# Patient Record
Sex: FEMALE | Race: BLACK OR AFRICAN AMERICAN | NOT HISPANIC OR LATINO | Employment: FULL TIME | URBAN - METROPOLITAN AREA
[De-identification: names, ages, dates, MRNs, and addresses within clinical notes are randomized per-mention and may not be internally consistent; named-entity substitution may affect disease eponyms.]

---

## 2017-04-27 ENCOUNTER — HISTORICAL (OUTPATIENT)
Dept: RADIOLOGY | Facility: HOSPITAL | Age: 72
End: 2017-04-27

## 2017-06-09 ENCOUNTER — HISTORICAL (OUTPATIENT)
Dept: ADMINISTRATIVE | Facility: HOSPITAL | Age: 72
End: 2017-06-09

## 2017-06-09 LAB
ABS NEUT (OLG): 3.3 X10(3)/MCL (ref 2.1–9.2)
ALBUMIN SERPL-MCNC: 4.1 GM/DL (ref 3.4–5)
ALBUMIN/GLOB SERPL: 1.3 RATIO (ref 1.1–2)
ALP SERPL-CCNC: 106 UNIT/L (ref 38–126)
ALT SERPL-CCNC: 16 UNIT/L (ref 12–78)
APPEARANCE, UA: CLEAR
AST SERPL-CCNC: 9 UNIT/L (ref 15–37)
BACTERIA SPEC CULT: NORMAL /HPF
BASOPHILS # BLD AUTO: 0 X10(3)/MCL (ref 0–0.2)
BASOPHILS NFR BLD AUTO: 0 %
BILIRUB SERPL-MCNC: 0.6 MG/DL (ref 0.2–1)
BILIRUB UR QL STRIP: NEGATIVE
BILIRUBIN DIRECT+TOT PNL SERPL-MCNC: 0.1 MG/DL (ref 0–0.5)
BILIRUBIN DIRECT+TOT PNL SERPL-MCNC: 0.5 MG/DL (ref 0–0.8)
BUN SERPL-MCNC: 11 MG/DL (ref 7–18)
CALCIUM SERPL-MCNC: 9.3 MG/DL (ref 8.5–10.1)
CHLORIDE SERPL-SCNC: 105 MMOL/L (ref 98–107)
CHOLEST SERPL-MCNC: 285 MG/DL (ref 0–200)
CHOLEST/HDLC SERPL: 4.3 {RATIO} (ref 0–4)
CO2 SERPL-SCNC: 30 MMOL/L (ref 21–32)
COLOR UR: YELLOW
CREAT SERPL-MCNC: 0.83 MG/DL (ref 0.55–1.02)
DEPRECATED CALCIDIOL+CALCIFEROL SERPL-MC: 10 NG/ML (ref 30–80)
EOSINOPHIL # BLD AUTO: 0.1 X10(3)/MCL (ref 0–0.9)
EOSINOPHIL NFR BLD AUTO: 2 %
ERYTHROCYTE [DISTWIDTH] IN BLOOD BY AUTOMATED COUNT: 12.7 % (ref 11.5–17)
GLOBULIN SER-MCNC: 3.2 GM/DL (ref 2.4–3.5)
GLUCOSE (UA): NEGATIVE
GLUCOSE SERPL-MCNC: 81 MG/DL (ref 74–106)
HCT VFR BLD AUTO: 40.9 % (ref 37–47)
HDLC SERPL-MCNC: 67 MG/DL (ref 35–60)
HGB BLD-MCNC: 12.9 GM/DL (ref 12–16)
HGB UR QL STRIP: NEGATIVE
KETONES UR QL STRIP: NEGATIVE
LDLC SERPL CALC-MCNC: 186 MG/DL (ref 0–129)
LEUKOCYTE ESTERASE UR QL STRIP: NEGATIVE
LYMPHOCYTES # BLD AUTO: 1.4 X10(3)/MCL (ref 0.6–4.6)
LYMPHOCYTES NFR BLD AUTO: 27 %
MCH RBC QN AUTO: 26.9 PG (ref 27–31)
MCHC RBC AUTO-ENTMCNC: 31.5 GM/DL (ref 33–36)
MCV RBC AUTO: 85.4 FL (ref 80–94)
MONOCYTES # BLD AUTO: 0.4 X10(3)/MCL (ref 0.1–1.3)
MONOCYTES NFR BLD AUTO: 7 %
NEUTROPHILS # BLD AUTO: 3.3 X10(3)/MCL (ref 2.1–9.2)
NEUTROPHILS NFR BLD AUTO: 62 %
NITRITE UR QL STRIP: NEGATIVE
PH UR STRIP: 5 [PH] (ref 5–9)
PLATELET # BLD AUTO: 300 X10(3)/MCL (ref 130–400)
PMV BLD AUTO: 11.2 FL (ref 9.4–12.4)
POTASSIUM SERPL-SCNC: 4.7 MMOL/L (ref 3.5–5.1)
PROT SERPL-MCNC: 7.3 GM/DL (ref 6.4–8.2)
PROT UR QL STRIP: NEGATIVE
RBC # BLD AUTO: 4.79 X10(6)/MCL (ref 4.2–5.4)
RBC #/AREA URNS HPF: NORMAL /[HPF]
SODIUM SERPL-SCNC: 143 MMOL/L (ref 136–145)
SP GR UR STRIP: 1.02 (ref 1–1.03)
SQUAMOUS EPITHELIAL, UA: NORMAL
TRIGL SERPL-MCNC: 160 MG/DL (ref 30–150)
TSH SERPL-ACNC: 0.15 MIU/ML (ref 0.36–3.74)
UROBILINOGEN UR STRIP-ACNC: 0.2
VLDLC SERPL CALC-MCNC: 32 MG/DL
WBC # SPEC AUTO: 5.3 X10(3)/MCL (ref 4.5–11.5)
WBC #/AREA URNS HPF: NORMAL /[HPF]

## 2018-06-21 ENCOUNTER — HISTORICAL (OUTPATIENT)
Dept: ADMINISTRATIVE | Facility: HOSPITAL | Age: 73
End: 2018-06-21

## 2018-09-10 ENCOUNTER — HISTORICAL (OUTPATIENT)
Dept: RADIOLOGY | Facility: HOSPITAL | Age: 73
End: 2018-09-10

## 2019-06-24 ENCOUNTER — HISTORICAL (OUTPATIENT)
Dept: ADMINISTRATIVE | Facility: HOSPITAL | Age: 74
End: 2019-06-24

## 2020-02-07 ENCOUNTER — HISTORICAL (OUTPATIENT)
Dept: ADMINISTRATIVE | Facility: HOSPITAL | Age: 75
End: 2020-02-07

## 2020-07-06 ENCOUNTER — HISTORICAL (OUTPATIENT)
Dept: ADMINISTRATIVE | Facility: HOSPITAL | Age: 75
End: 2020-07-06

## 2020-09-01 ENCOUNTER — HISTORICAL (OUTPATIENT)
Dept: RADIOLOGY | Facility: HOSPITAL | Age: 75
End: 2020-09-01

## 2020-12-07 ENCOUNTER — HISTORICAL (OUTPATIENT)
Dept: ADMINISTRATIVE | Facility: HOSPITAL | Age: 75
End: 2020-12-07

## 2021-04-06 ENCOUNTER — HISTORICAL (OUTPATIENT)
Dept: ADMINISTRATIVE | Facility: HOSPITAL | Age: 76
End: 2021-04-06

## 2021-06-03 ENCOUNTER — HISTORICAL (OUTPATIENT)
Dept: ADMINISTRATIVE | Facility: HOSPITAL | Age: 76
End: 2021-06-03

## 2021-06-03 LAB
ABS NEUT (OLG): 3.88 X10(3)/MCL (ref 2.1–9.2)
ALBUMIN SERPL-MCNC: 3.8 GM/DL (ref 3.4–4.8)
ALBUMIN/GLOB SERPL: 1.4 RATIO (ref 1.1–2)
ALP SERPL-CCNC: 110 UNIT/L (ref 40–150)
ALT SERPL-CCNC: 6 UNIT/L (ref 0–55)
APPEARANCE, UA: CLEAR
AST SERPL-CCNC: 12 UNIT/L (ref 5–34)
BACTERIA SPEC CULT: ABNORMAL /HPF
BASOPHILS # BLD AUTO: 0 X10(3)/MCL (ref 0–0.2)
BASOPHILS NFR BLD AUTO: 0 %
BILIRUB SERPL-MCNC: 0.6 MG/DL
BILIRUB UR QL STRIP: NEGATIVE
BILIRUBIN DIRECT+TOT PNL SERPL-MCNC: 0.2 MG/DL (ref 0–0.5)
BILIRUBIN DIRECT+TOT PNL SERPL-MCNC: 0.4 MG/DL (ref 0–0.8)
BUN SERPL-MCNC: 14.3 MG/DL (ref 9.8–20.1)
CALCIUM SERPL-MCNC: 9.5 MG/DL (ref 8.4–10.2)
CHLORIDE SERPL-SCNC: 105 MMOL/L (ref 98–107)
CHOLEST SERPL-MCNC: 202 MG/DL
CHOLEST/HDLC SERPL: 4 {RATIO} (ref 0–5)
CO2 SERPL-SCNC: 27 MMOL/L (ref 23–31)
COLOR UR: YELLOW
CREAT SERPL-MCNC: 0.82 MG/DL (ref 0.55–1.02)
EOSINOPHIL # BLD AUTO: 0.3 X10(3)/MCL (ref 0–0.9)
EOSINOPHIL NFR BLD AUTO: 4 %
ERYTHROCYTE [DISTWIDTH] IN BLOOD BY AUTOMATED COUNT: 13 % (ref 11.5–17)
GLOBULIN SER-MCNC: 2.8 GM/DL (ref 2.4–3.5)
GLUCOSE (UA): NEGATIVE
GLUCOSE SERPL-MCNC: 87 MG/DL (ref 82–115)
HCT VFR BLD AUTO: 38.8 % (ref 37–47)
HDLC SERPL-MCNC: 48 MG/DL (ref 35–60)
HGB BLD-MCNC: 12.3 GM/DL (ref 12–16)
HGB UR QL STRIP: NEGATIVE
KETONES UR QL STRIP: NEGATIVE
LDLC SERPL CALC-MCNC: 133 MG/DL (ref 50–140)
LEUKOCYTE ESTERASE UR QL STRIP: ABNORMAL
LYMPHOCYTES # BLD AUTO: 1.7 X10(3)/MCL (ref 0.6–4.6)
LYMPHOCYTES NFR BLD AUTO: 26 %
MCH RBC QN AUTO: 26.7 PG (ref 27–31)
MCHC RBC AUTO-ENTMCNC: 31.7 GM/DL (ref 33–36)
MCV RBC AUTO: 84.2 FL (ref 80–94)
MONOCYTES # BLD AUTO: 0.5 X10(3)/MCL (ref 0.1–1.3)
MONOCYTES NFR BLD AUTO: 8 %
NEUTROPHILS # BLD AUTO: 3.88 X10(3)/MCL (ref 2.1–9.2)
NEUTROPHILS NFR BLD AUTO: 61 %
NITRITE UR QL STRIP: NEGATIVE
PH UR STRIP: 6 [PH] (ref 5–9)
PLATELET # BLD AUTO: 302 X10(3)/MCL (ref 130–400)
PMV BLD AUTO: 11.7 FL (ref 9.4–12.4)
POTASSIUM SERPL-SCNC: 4.6 MMOL/L (ref 3.5–5.1)
PROT SERPL-MCNC: 6.6 GM/DL (ref 5.8–7.6)
PROT UR QL STRIP: NEGATIVE
RBC # BLD AUTO: 4.61 X10(6)/MCL (ref 4.2–5.4)
RBC #/AREA URNS HPF: ABNORMAL /[HPF]
SODIUM SERPL-SCNC: 144 MMOL/L (ref 136–145)
SP GR UR STRIP: 1.02 (ref 1–1.03)
SQUAMOUS EPITHELIAL, UA: ABNORMAL /HPF (ref 0–4)
T3FREE SERPL-MCNC: 3.4 PG/ML (ref 1.58–3.91)
T4 FREE SERPL-MCNC: 1.3 NG/DL (ref 0.7–1.48)
TRIGL SERPL-MCNC: 104 MG/DL (ref 37–140)
TSH SERPL-ACNC: <0.0083 UIU/ML (ref 0.35–4.94)
UROBILINOGEN UR STRIP-ACNC: 0.2
VLDLC SERPL CALC-MCNC: 21 MG/DL
WBC # SPEC AUTO: 6.4 X10(3)/MCL (ref 4.5–11.5)
WBC #/AREA URNS HPF: ABNORMAL /[HPF]

## 2021-10-13 ENCOUNTER — HISTORICAL (OUTPATIENT)
Dept: RADIOLOGY | Facility: HOSPITAL | Age: 76
End: 2021-10-13

## 2021-12-08 ENCOUNTER — HISTORICAL (OUTPATIENT)
Dept: ADMINISTRATIVE | Facility: HOSPITAL | Age: 76
End: 2021-12-08

## 2021-12-08 LAB
ABS NEUT (OLG): 4.03 X10(3)/MCL (ref 2.1–9.2)
ALBUMIN SERPL-MCNC: 4.2 GM/DL (ref 3.4–4.8)
ALBUMIN/GLOB SERPL: 1.4 RATIO (ref 1.1–2)
ALP SERPL-CCNC: 114 UNIT/L (ref 40–150)
ALT SERPL-CCNC: 10 UNIT/L (ref 0–55)
APPEARANCE, UA: CLEAR
AST SERPL-CCNC: 13 UNIT/L (ref 5–34)
BACTERIA SPEC CULT: ABNORMAL /HPF
BASOPHILS # BLD AUTO: 0 X10(3)/MCL (ref 0–0.2)
BASOPHILS NFR BLD AUTO: 0 %
BILIRUB SERPL-MCNC: 0.6 MG/DL
BILIRUB UR QL STRIP: NEGATIVE
BILIRUBIN DIRECT+TOT PNL SERPL-MCNC: 0.2 MG/DL (ref 0–0.5)
BILIRUBIN DIRECT+TOT PNL SERPL-MCNC: 0.4 MG/DL (ref 0–0.8)
BUN SERPL-MCNC: 12.5 MG/DL (ref 9.8–20.1)
CALCIUM SERPL-MCNC: 9.8 MG/DL (ref 8.7–10.5)
CHLORIDE SERPL-SCNC: 105 MMOL/L (ref 98–107)
CHOLEST SERPL-MCNC: 216 MG/DL
CHOLEST/HDLC SERPL: 4 {RATIO} (ref 0–5)
CO2 SERPL-SCNC: 27 MMOL/L (ref 23–31)
COLOR UR: YELLOW
CREAT SERPL-MCNC: 0.83 MG/DL (ref 0.55–1.02)
DEPRECATED CALCIDIOL+CALCIFEROL SERPL-MC: 38.7 NG/ML (ref 30–80)
EOSINOPHIL # BLD AUTO: 0.1 X10(3)/MCL (ref 0–0.9)
EOSINOPHIL NFR BLD AUTO: 2 %
ERYTHROCYTE [DISTWIDTH] IN BLOOD BY AUTOMATED COUNT: 12.2 % (ref 11.5–17)
GLOBULIN SER-MCNC: 3 GM/DL (ref 2.4–3.5)
GLUCOSE (UA): NEGATIVE
GLUCOSE SERPL-MCNC: 87 MG/DL (ref 82–115)
HCT VFR BLD AUTO: 42.5 % (ref 37–47)
HDLC SERPL-MCNC: 50 MG/DL (ref 35–60)
HGB BLD-MCNC: 13.2 GM/DL (ref 12–16)
HGB UR QL STRIP: NEGATIVE
KETONES UR QL STRIP: NEGATIVE
LDLC SERPL CALC-MCNC: 143 MG/DL (ref 50–140)
LEUKOCYTE ESTERASE UR QL STRIP: ABNORMAL
LYMPHOCYTES # BLD AUTO: 1.6 X10(3)/MCL (ref 0.6–4.6)
LYMPHOCYTES NFR BLD AUTO: 26 %
MCH RBC QN AUTO: 26.5 PG (ref 27–31)
MCHC RBC AUTO-ENTMCNC: 31.1 GM/DL (ref 33–36)
MCV RBC AUTO: 85.3 FL (ref 80–94)
MONOCYTES # BLD AUTO: 0.4 X10(3)/MCL (ref 0.1–1.3)
MONOCYTES NFR BLD AUTO: 6 %
NEUTROPHILS # BLD AUTO: 4.03 X10(3)/MCL (ref 2.1–9.2)
NEUTROPHILS NFR BLD AUTO: 65 %
NITRITE UR QL STRIP: NEGATIVE
PH UR STRIP: 5.5 [PH] (ref 5–9)
PLATELET # BLD AUTO: 298 X10(3)/MCL (ref 130–400)
PMV BLD AUTO: 11.5 FL (ref 9.4–12.4)
POTASSIUM SERPL-SCNC: 3.9 MMOL/L (ref 3.5–5.1)
PROT SERPL-MCNC: 7.2 GM/DL (ref 5.8–7.6)
PROT UR QL STRIP: NEGATIVE
RBC # BLD AUTO: 4.98 X10(6)/MCL (ref 4.2–5.4)
RBC #/AREA URNS HPF: ABNORMAL /[HPF]
SODIUM SERPL-SCNC: 143 MMOL/L (ref 136–145)
SP GR UR STRIP: 1.02 (ref 1–1.03)
SQUAMOUS EPITHELIAL, UA: ABNORMAL /HPF (ref 0–4)
T3FREE SERPL-MCNC: 3.85 PG/ML (ref 1.58–3.91)
T4 FREE SERPL-MCNC: 1.51 NG/DL (ref 0.7–1.48)
TRIGL SERPL-MCNC: 116 MG/DL (ref 37–140)
TSH SERPL-ACNC: <0.0083 UIU/ML (ref 0.35–4.94)
UROBILINOGEN UR STRIP-ACNC: 0.2
VLDLC SERPL CALC-MCNC: 23 MG/DL
WBC # SPEC AUTO: 6.2 X10(3)/MCL (ref 4.5–11.5)
WBC #/AREA URNS HPF: ABNORMAL /[HPF]

## 2022-04-09 ENCOUNTER — HISTORICAL (OUTPATIENT)
Dept: ADMINISTRATIVE | Facility: HOSPITAL | Age: 77
End: 2022-04-09
Payer: MEDICARE

## 2022-04-25 VITALS
BODY MASS INDEX: 25.52 KG/M2 | OXYGEN SATURATION: 98 % | DIASTOLIC BLOOD PRESSURE: 74 MMHG | HEIGHT: 63 IN | SYSTOLIC BLOOD PRESSURE: 126 MMHG | WEIGHT: 144 LBS

## 2022-05-02 NOTE — HISTORICAL [facility]ER
This is a historical note converted from Arturo. Formatting and pictures may have been removed.  Please reference Arturo for original formatting and attached multimedia. Chief Complaint  Pt c/o right knee pain and swelling, no prior sx. Pt states in 2006 she broke her right patella.  History of Present Illness  Patient comes in today with complaints of right knee issues.  She?states that her knee does not really cause her much pain but is mainly stiffness she is concerned about want to have it evaluated.  Patient states she is had no recent injury or trauma?to the right knee and pain again is not a real issue with her.  She is been using occasional over-the-counter anti-inflammatories but not on a regular basis  Review of Systems  Constitutional: No fever, No chills.  Respiratory: No shortness of breath, No cough.  Cardiovascular: No chest pain.  Gastrointestinal: No nausea, No vomiting, No diarrhea, No constipation, No heartburn.  Genitourinary: No dysuria, No hematuria.  Hematology/Lymphatics: No bleeding tendency.  Endocrine: No polyuria.  Neurologic: Alert and oriented X4, No numbness, No tingling.  Psychiatric: No anxiety, No depression.  Integumentary:? negative except as documented in history of present illness  Physical Exam  Vitals & Measurements  HR:?74(Peripheral)? BP:?128/72?  HT:?160?cm? WT:?63.502?kg? BMI:?24.81?  Right knee exam  Mild?tenderness to palpation of the medial joint line  No effusion to the knee  Crepitus with ROM  ROM   Negative?Lianet?  No?laxity with varus / valgus stressing  Varus alignment of the knee  Good pulses to the feet  No pain with ROM of the hips  ??  X-rays:  Degenerative changes of the knee with narrowing of the medial compartment(s)?not quite bone-on-bone  Extra-articular osteophytes  Varus alignment  Diagnosis: Right knee osteoarthritis  Plan:?At this point the patient states pain is not a real issue with her and she does not wish to have an injection.  She states  that she would?use of over-the-counter anti-inflammatories work on some home exercises and she will follow-up in the future if pain becomes more problematic for.  ?  ?  Assessment/Plan  1.?Arthritis of right knee?M17.11   Problem List/Past Medical History  Ongoing  Arthritis of right knee  Foot pain  GERD (gastroesophageal reflux disease)  HLD (hyperlipidemia)  HTN (Hypertension)(  Confirmed  )  Low TSH level  Skin tag  Visit for screening mammogram  Vitamin D deficiency  Well adult exam  Historical  No qualifying data  Procedure/Surgical History  Colonoscopy (12/18/2017)  Colonoscopy (09/25/2014)  CLOSURE OF SKIN AND SUBCUTANEOUS TISSUE OTHER SITES (03/07/2014)  Simple repair of superficial wounds of face, ears, eyelids, nose, lips and/or mucous membranes; 2.5 cm or less. (03/07/2014)  WOUND CLOSURE UTILIZING TISSUE ADHESIVE(S) ONLY (03/07/2014)  Percutaneous abdominal drainage (07/17/2013)  Laparoscopic right hemicolectomy (07/02/2013)  Right hemicolectomy (2013)  Partial thyroidectomy (1997)  Tubal ligation (1985)  left knee arthroscopy (1980)   Medications  Allegra 24 Hour Allergy oral tablet, 180 mg= 1 tab(s), Oral, Daily  amlodipine 10 mg oral tablet, 10 mg= 1 tab(s), Oral, Daily, 3 refills  hydroCHLOROthiazide 12.5 mg oral capsule, See Instructions, 3 refills  meloxicam 7.5 mg oral tablet, 7.5 mg= 1 tab(s), Oral, Daily  Vitamin D3 1000 intl units oral capsule, 3000 IntUnit= 3 cap(s), Oral, Daily  Allergies  ACE inhibitors?(tongue swelling)  Social History  Abuse/Neglect  No, 02/07/2020  Alcohol - Denies Alcohol Use, 06/25/2013  Current, 1-2 times per year, 05/19/2015  Employment/School  Retired, 05/19/2015  Exercise  Exercise frequency: 1-2 times/week. Exercise type: Walking., 05/19/2015  Home/Environment  Lives with Alone., 05/19/2015  Nutrition/Health  Regular, 05/19/2015  Sexual  Sexually active: No., 05/31/2016  Substance Use - Denies Substance Abuse, 06/25/2013  Never, 05/19/2015  Tobacco - Denies  Tobacco Use, 06/25/2013  Never (less than 100 in lifetime), No, 02/07/2020  Family History  Hypertension.: Mother.  Primary malignant neoplasm of colon: Mother.  Tobacco: Negative: Mother.  Immunizations  Vaccine Date Status Comments   tetanus toxoid - Not Given Patient Refuses     Health Maintenance  Health Maintenance  ???Pending?(in the next year)  ??? ??OverDue  ??? ? ? ?Advance Directive due??01/01/20??and every 1??year(s)  ??? ? ? ?Geriatric Depression Screening due??01/01/20??and every 1??year(s)  ??? ??Due?  ??? ? ? ?Alcohol Misuse Screening due??01/01/20??and every 1??year(s)  ??? ? ? ?Cognitive Screening due??01/01/20??and every 1??year(s)  ??? ? ? ?Fall Risk Assessment due??01/01/20??and every 1??year(s)  ??? ? ? ?Functional Assessment due??01/01/20??and every 1??year(s)  ??? ? ? ?Bone Density Screening due??02/14/20??Variable frequency  ??? ? ? ?Pneumococcal Vaccine due??02/14/20??and every?  ??? ? ? ?Tetanus Vaccine due??02/14/20??and every 10??year(s)  ??? ? ? ?Zoster Vaccine due??02/14/20??and every 100??year(s)  ??? ??Refused?  ??? ? ? ?Pneumococcal Vaccine due??07/18/13??and every 1??day(s)  ??? ??Due In Future?  ??? ? ? ?Hypertension Management-BMP not due until??06/23/20??and every 1??year(s)  ??? ? ? ?ADL Screening not due until??07/03/20??and every 1??year(s)  ??? ? ? ?Medicare Annual Wellness Exam not due until??07/03/20??and every 1??year(s)  ??? ? ? ?Aspirin Therapy for CVD Prevention not due until??07/03/20??and every 1??year(s)  ??? ? ? ?Hypertension Management-Education not due until??07/03/20??and every 1??year(s)  ??? ? ? ?Breast Cancer Screening (Senior Wellness) not due until??09/09/20??and every 2??year(s)  ??? ? ? ?Obesity Screening not due until??01/01/21??and every 1??year(s)  ??? ? ? ?Hypertension Management-Blood Pressure not due until??02/06/21??and every 1??year(s)  ???Satisfied?(in the past 1 year)  ??? ??Satisfied?  ??? ? ? ?ADL Screening on??07/03/19.??Satisfied by Leandra  Scarlet WINCHESTER.  ??? ? ? ?Advance Directive on??07/03/19.??Satisfied by Scarlet Mobley LPN.  ??? ? ? ?Alcohol Misuse Screening on??07/03/19.??Satisfied by Scarlet Mobley LPN.  ??? ? ? ?Aspirin Therapy for CVD Prevention on??07/03/19.??Satisfied by Scarlet Mobley LPN.  ??? ? ? ?Blood Pressure Screening on??02/07/20.??Satisfied by Gustavo Blackmon  ??? ? ? ?Body Mass Index Check on??02/07/20.??Satisfied by Gustavo Blackmon  ??? ? ? ?Cognitive Screening on??07/03/19.??Satisfied by Scarlet Mobley LPN.  ??? ? ? ?Depression Screening on??07/03/19.??Satisfied by Scarlet Mobley LPN.  ??? ? ? ?Diabetes Screening on??06/24/19.??Satisfied by Carmel Keller.  ??? ? ? ?Fall Risk Assessment on??07/03/19.??Satisfied by Scarlet Mobley LPN.  ??? ? ? ?Functional Assessment on??07/03/19.??Satisfied by Scarlet Mobley LPN.  ??? ? ? ?Geriatric Depression Screening on??07/03/19.??Satisfied by Scarlet Mobley LPN.  ??? ? ? ?Hypertension Management-Blood Pressure on??02/07/20.??Satisfied by Gustavo Blackmon  ??? ? ? ?Influenza Vaccine on??02/07/20.??Satisfied by Gustavo Blackmon  ??? ? ? ?Lipid Screening on??06/24/19.??Satisfied by Carmel Keller  ??? ? ? ?Medicare Annual Wellness Exam on??07/03/19.??Satisfied by Anand Gordon II, MD  ??? ? ? ?Obesity Screening on??02/07/20.??Satisfied by Gustavo Blackmon  ?

## 2022-06-06 RX ORDER — AZITHROMYCIN 500 MG/1
500 TABLET, FILM COATED ORAL DAILY
Qty: 5 TABLET | Refills: 0 | Status: SHIPPED | OUTPATIENT
Start: 2022-06-06 | End: 2022-06-11

## 2022-06-06 RX ORDER — METHYLPREDNISOLONE 4 MG/1
TABLET ORAL
Qty: 21 EACH | Refills: 0 | Status: SHIPPED | OUTPATIENT
Start: 2022-06-06 | End: 2022-06-27

## 2022-06-06 NOTE — TELEPHONE ENCOUNTER
----- Message from Anand Gordon II, MD sent at 6/6/2022 11:34 AM CDT -----  Regarding: RE: Meds  Azithromycin 500 daily for 5 days and medrol pack  ----- Message -----  From: Scarlet Mobley LPN  Sent: 6/6/2022  10:57 AM CDT  To: Anand Gordon II, MD  Subject: FW: Meds                                           ----- Message -----  From: Bernice Gomez MA  Sent: 6/6/2022  10:47 AM CDT  To: Scarlet Mobley LPN  Subject: Meds                                             Patient has been having sinus issues for the past week & wants to know if something can be called in for her...

## 2022-06-20 RX ORDER — CODEINE PHOSPHATE AND GUAIFENESIN 10; 100 MG/5ML; MG/5ML
10 SOLUTION ORAL EVERY 6 HOURS PRN
Qty: 120 ML | Refills: 0 | Status: SHIPPED | OUTPATIENT
Start: 2022-06-20 | End: 2022-06-30

## 2022-06-20 RX ORDER — AZELASTINE 1 MG/ML
2 SPRAY, METERED NASAL 2 TIMES DAILY
Qty: 30 ML | Refills: 0 | Status: SHIPPED | OUTPATIENT
Start: 2022-06-20 | End: 2023-01-04 | Stop reason: SDUPTHER

## 2022-06-20 NOTE — TELEPHONE ENCOUNTER
----- Message from Anand Gordon II, MD sent at 6/20/2022  9:46 AM CDT -----  Tylenol 1000mg TID for 3 days and charatussin and azelastine BID  ----- Message -----  From: Scarlet Mobley LPN  Sent: 6/20/2022   9:39 AM CDT  To: Anand Gordon II, MD      ----- Message -----  From: Gretchen Gray  Sent: 6/20/2022   8:50 AM CDT  To: Scarlet Mobley LPN    Diagnosed with covid on last Wednesday  Still has a cough, and mucus in nasal passages    Uses [facility] on [address]

## 2022-06-29 ENCOUNTER — HOSPITAL ENCOUNTER (EMERGENCY)
Facility: HOSPITAL | Age: 77
Discharge: HOME | End: 2022-06-29
Attending: STUDENT IN AN ORGANIZED HEALTH CARE EDUCATION/TRAINING PROGRAM
Payer: MEDICARE

## 2022-06-29 VITALS
TEMPERATURE: 98 F | DIASTOLIC BLOOD PRESSURE: 90 MMHG | RESPIRATION RATE: 20 BRPM | SYSTOLIC BLOOD PRESSURE: 163 MMHG | HEART RATE: 89 BPM | OXYGEN SATURATION: 100 %

## 2022-06-29 DIAGNOSIS — R50.9 FEVER: ICD-10-CM

## 2022-06-29 LAB
ALBUMIN SERPL-MCNC: 4 GM/DL (ref 3.4–4.8)
ALBUMIN/GLOB SERPL: 1.4 RATIO (ref 1.1–2)
ALP SERPL-CCNC: 119 UNIT/L (ref 40–150)
ALT SERPL-CCNC: 10 UNIT/L (ref 0–55)
APPEARANCE UR: CLEAR
AST SERPL-CCNC: 12 UNIT/L (ref 5–34)
BACTERIA #/AREA URNS AUTO: NORMAL /HPF
BASOPHILS # BLD AUTO: 0.02 X10(3)/MCL (ref 0–0.2)
BASOPHILS NFR BLD AUTO: 0.2 %
BILIRUB UR QL STRIP.AUTO: NEGATIVE MG/DL
BILIRUBIN DIRECT+TOT PNL SERPL-MCNC: 0.3 MG/DL
BUN SERPL-MCNC: 13.4 MG/DL (ref 9.8–20.1)
CALCIUM SERPL-MCNC: 9.5 MG/DL (ref 8.4–10.2)
CHLORIDE SERPL-SCNC: 104 MMOL/L (ref 98–107)
CO2 SERPL-SCNC: 25 MMOL/L (ref 23–31)
COLOR UR AUTO: YELLOW
CREAT SERPL-MCNC: 0.9 MG/DL (ref 0.55–1.02)
EOSINOPHIL # BLD AUTO: 0.29 X10(3)/MCL (ref 0–0.9)
EOSINOPHIL NFR BLD AUTO: 2.6 %
ERYTHROCYTE [DISTWIDTH] IN BLOOD BY AUTOMATED COUNT: 12.6 % (ref 11.5–17)
GLOBULIN SER-MCNC: 2.9 GM/DL (ref 2.4–3.5)
GLUCOSE SERPL-MCNC: 117 MG/DL (ref 82–115)
GLUCOSE UR QL STRIP.AUTO: NEGATIVE MG/DL
HCT VFR BLD AUTO: 39.1 % (ref 37–47)
HGB BLD-MCNC: 12.3 GM/DL (ref 12–16)
IMM GRANULOCYTES # BLD AUTO: 0.06 X10(3)/MCL (ref 0–0.02)
IMM GRANULOCYTES NFR BLD AUTO: 0.5 % (ref 0–0.43)
KETONES UR QL STRIP.AUTO: NEGATIVE MG/DL
LEUKOCYTE ESTERASE UR QL STRIP.AUTO: ABNORMAL UNIT/L
LYMPHOCYTES # BLD AUTO: 2.19 X10(3)/MCL (ref 0.6–4.6)
LYMPHOCYTES NFR BLD AUTO: 20 %
MCH RBC QN AUTO: 27 PG (ref 27–31)
MCHC RBC AUTO-ENTMCNC: 31.5 MG/DL (ref 33–36)
MCV RBC AUTO: 85.9 FL (ref 80–94)
MONOCYTES # BLD AUTO: 0.6 X10(3)/MCL (ref 0.1–1.3)
MONOCYTES NFR BLD AUTO: 5.5 %
NEUTROPHILS # BLD AUTO: 7.8 X10(3)/MCL (ref 2.1–9.2)
NEUTROPHILS NFR BLD AUTO: 71.2 %
NITRITE UR QL STRIP.AUTO: NEGATIVE
NRBC BLD AUTO-RTO: 0 %
PH UR STRIP.AUTO: 6.5 [PH]
PLATELET # BLD AUTO: 333 X10(3)/MCL (ref 130–400)
PMV BLD AUTO: 10.2 FL (ref 7.4–10.4)
POTASSIUM SERPL-SCNC: 3.7 MMOL/L (ref 3.5–5.1)
PROT SERPL-MCNC: 6.9 GM/DL (ref 5.8–7.6)
PROT UR QL STRIP.AUTO: NEGATIVE MG/DL
RBC # BLD AUTO: 4.55 X10(6)/MCL (ref 4.2–5.4)
RBC #/AREA URNS AUTO: <5 /HPF
RBC UR QL AUTO: NEGATIVE UNIT/L
SODIUM SERPL-SCNC: 140 MMOL/L (ref 136–145)
SP GR UR STRIP.AUTO: 1 (ref 1–1.03)
SQUAMOUS #/AREA URNS AUTO: <5 /HPF
UROBILINOGEN UR STRIP-ACNC: 0.2 MG/DL
WBC # SPEC AUTO: 11 X10(3)/MCL (ref 4.5–11.5)
WBC #/AREA URNS AUTO: <5 /HPF

## 2022-06-29 PROCEDURE — 80053 COMPREHEN METABOLIC PANEL: CPT | Performed by: PHYSICIAN ASSISTANT

## 2022-06-29 PROCEDURE — 36415 COLL VENOUS BLD VENIPUNCTURE: CPT | Performed by: PHYSICIAN ASSISTANT

## 2022-06-29 PROCEDURE — 99284 EMERGENCY DEPT VISIT MOD MDM: CPT | Mod: 25

## 2022-06-29 PROCEDURE — 81001 URINALYSIS AUTO W/SCOPE: CPT | Performed by: PHYSICIAN ASSISTANT

## 2022-06-29 PROCEDURE — 85025 COMPLETE CBC W/AUTO DIFF WBC: CPT | Performed by: PHYSICIAN ASSISTANT

## 2022-06-29 NOTE — FIRST PROVIDER EVALUATION
Medical screening exam completed.  I have conducted a focused provider triage encounter, findings are as follows:    Brief history of present illness:  76 yo female presents to ED for evaluation of fever of 100.1 at home. Reports intermittent cough. Took Tylenol 45 min ago. States COVID+ on 6/11/22.     Vitals:    06/29/22 1840   BP: (!) 156/87   BP Location: Left arm   Patient Position: Sitting   Pulse: 108   Resp: 18   Temp: 98.2 °F (36.8 °C)   TempSrc: Oral   SpO2: 100%       Pertinent physical exam:  Awake, alert and oriented.    Brief workup plan:  Labs, CXR, UA    Preliminary workup initiated; this workup will be continued and followed by the physician or advanced practice provider that is assigned to the patient when roomed.

## 2022-06-30 NOTE — DISCHARGE INSTRUCTIONS
Follow-up with your primary care physician.    Use an oral thermometer when measuring your temperature.    Return to the emergency department if you have any new symptoms, difficulty breathing, chest pain, nausea, vomiting, fever, leg swelling  or any new symptoms.

## 2022-06-30 NOTE — ED PROVIDER NOTES
Encounter Date: 6/29/2022    SCRIBE #1 NOTE: I, Rohan Hood, am scribing for, and in the presence of,  Christian Stauffer. I have scribed the following portions of the note - Other sections scribed: HPI, ROS, PE, MDM.       History     Chief Complaint   Patient presents with    Fever     Starting this afternoon. Tested positive for covid 06/11.     IChristian, assumed care at 2315    76 y/o F with hx of HTN presents to ED c/o fever that onset this afternoon with her highest checked fever being 100.1 with a temporal thermometer. Pt had COVID on 6/11 and was back to normal on 6/16. Pt explains she was concerned about a possible infection, but denies cough, N/V, chills, dysuria, rash, chest pain, or SOB. Pt is adherent with her HTN medications.    The history is provided by the patient. No  was used.   Fever  Primary symptoms of the febrile illness include fever. Primary symptoms do not include fatigue, cough, nausea, vomiting, dysuria or rash. The current episode started today. This is a new problem. The problem has been resolved.   The maximum temperature recorded prior to her arrival was 100 to 100.9 F. Temperature source: temporal thermometer.     Review of patient's allergies indicates:   Allergen Reactions    Ace inhibitors      Other reaction(s): tongue swelling     Past Medical History:   Diagnosis Date    Hypertension      History reviewed. No pertinent surgical history.  History reviewed. No pertinent family history.  Social History     Tobacco Use    Smoking status: Never Smoker    Smokeless tobacco: Never Used     Review of Systems   Constitutional: Positive for fever. Negative for fatigue.   HENT: Negative.  Negative for rhinorrhea.    Eyes: Negative.    Respiratory: Negative.  Negative for cough.    Cardiovascular: Negative.    Gastrointestinal: Negative.  Negative for nausea and vomiting.   Endocrine: Negative.    Genitourinary: Negative.  Negative for dysuria.   Musculoskeletal:  Negative.    Skin: Negative.  Negative for rash.   Allergic/Immunologic: Negative.    Neurological: Negative.    Hematological: Negative.    Psychiatric/Behavioral: Negative.    All other systems reviewed and are negative.      Physical Exam     Initial Vitals [06/29/22 1840]   BP Pulse Resp Temp SpO2   (!) 156/87 108 18 98.2 °F (36.8 °C) 100 %      MAP       --         Physical Exam    Nursing note and vitals reviewed.  Constitutional: She appears well-developed and well-nourished. She does not appear ill. No distress.   HENT:   Head: Normocephalic and atraumatic.   Mouth/Throat: Oropharynx is clear and moist.   Eyes: Conjunctivae and EOM are normal. Pupils are equal, round, and reactive to light.   Neck: Neck supple.   Normal range of motion.  Cardiovascular: Normal rate, normal heart sounds and intact distal pulses.   No murmur heard.  Tachy   Pulmonary/Chest: Breath sounds normal. No respiratory distress. She has no wheezes. She has no rales. She exhibits no tenderness.   Abdominal: Abdomen is soft. Bowel sounds are normal. She exhibits no distension. There is no abdominal tenderness.   No right CVA tenderness.  No left CVA tenderness. There is no rebound.   Musculoskeletal:         General: No tenderness or edema. Normal range of motion.      Cervical back: Normal range of motion and neck supple.      Lumbar back: Normal. Normal range of motion.     Neurological: She is alert and oriented to person, place, and time. She has normal strength. No cranial nerve deficit or sensory deficit. GCS score is 15. GCS eye subscore is 4. GCS verbal subscore is 5. GCS motor subscore is 6.   Skin: Skin is warm and dry. Capillary refill takes less than 2 seconds. No rash noted. No erythema.   Psychiatric: She has a normal mood and affect. Her mood appears not anxious.         ED Course   Procedures  Labs Reviewed   COMPREHENSIVE METABOLIC PANEL - Abnormal; Notable for the following components:       Result Value    Glucose  Level 117 (*)     All other components within normal limits   URINALYSIS, REFLEX TO URINE CULTURE - Abnormal; Notable for the following components:    Leukocyte Esterase, UA Trace (*)     All other components within normal limits   CBC WITH DIFFERENTIAL - Abnormal; Notable for the following components:    MCHC 31.5 (*)     IG# 0.06 (*)     IG% 0.5 (*)     All other components within normal limits   URINALYSIS, MICROSCOPIC - Normal   CBC W/ AUTO DIFFERENTIAL    Narrative:     The following orders were created for panel order CBC auto differential.  Procedure                               Abnormality         Status                     ---------                               -----------         ------                     CBC with Differential[[id number]]        Abnormal            Final result                 Please view results for these tests on the individual orders.          Imaging Results          X-Ray Chest 1 View (Final result)  Result time 06/30/22 09:04:08    Final result by Kerrie Gallagher MD (06/30/22 09:04:08)                 Impression:      No acute cardiopulmonary abnormality.      Electronically signed by: Kerrie Gallagher  Date:    06/30/2022  Time:    09:04             Narrative:    EXAMINATION:  XR CHEST 1 VIEW    CLINICAL HISTORY:  Fever, unspecified    TECHNIQUE:  Single frontal view of the chest was performed.    COMPARISON:  04/02/2017 plain radiograph, CT 04/02/2017    FINDINGS:  LINES AND TUBES: None    MEDIASTINUM AND TUNDE: The cardiac silhouette is normal.    LUNGS: No lobar consolidation. No edema.  Deviation of the trachea to the right corresponds with enlarged left lobe of the thyroid seen on prior CT exam.  Appearance is similar to prior.    PLEURA:No pleural effusion. No pneumothorax.    BONES: No acute osseous abnormality.                                 Medications - No data to display  Medical Decision Making:   Clinical Tests:   Lab Tests: Reviewed and Ordered  Radiological  Study: Reviewed and Ordered  ED Management:  Patient is a 76 y/o female presents for fever measured with temporal thermometer.  See HPI/exam.  Labs/imaging as noted.  Vital signs stable.  Reassessed patient.  Patient is resting comfortably.  Discussed all results.  Discussed need for follow-up.  Discussed return precautions.  Answered all questions at this time.  Hemodynamically stable for continued outpatient management with strict return precautions.  Patient and family verbalized understanding agreed to plan.            Scribe Attestation:   Scribe #1: I performed the above scribed service and the documentation accurately describes the services I performed. I attest to the accuracy of the note.    Attending Attestation:           Physician Attestation for Scribe:  Physician Attestation Statement for Scribe #1: I, Christian Stauffer, reviewed documentation, as scribed by Rohan Hood in my presence, and it is both accurate and complete.                      Clinical Impression:   Final diagnoses:  [R50.9] Fever          ED Disposition Condition    Discharge Stable        ED Prescriptions     None        Follow-up Information     Follow up With Specialties Details Why Contact Info    Anand Gordon II, MD Internal Medicine Schedule an appointment as soon as possible for a visit in 1 day  [address] [facility] [address]  [phone]             Christian Stauffer MD  07/08/22 2914

## 2022-07-11 DIAGNOSIS — I10 HYPERTENSION, UNSPECIFIED TYPE: Primary | ICD-10-CM

## 2022-07-11 RX ORDER — LOSARTAN POTASSIUM AND HYDROCHLOROTHIAZIDE 12.5; 1 MG/1; MG/1
1 TABLET ORAL DAILY
COMMUNITY
Start: 2022-04-03 | End: 2022-07-11 | Stop reason: SDUPTHER

## 2022-07-11 RX ORDER — LOSARTAN POTASSIUM AND HYDROCHLOROTHIAZIDE 12.5; 1 MG/1; MG/1
1 TABLET ORAL DAILY
Qty: 90 TABLET | Refills: 3 | Status: SHIPPED | OUTPATIENT
Start: 2022-07-11 | End: 2023-07-19 | Stop reason: SDUPTHER

## 2022-09-01 ENCOUNTER — HOSPITAL ENCOUNTER (OUTPATIENT)
Dept: RADIOLOGY | Facility: CLINIC | Age: 77
Discharge: HOME | End: 2022-09-01
Attending: PHYSICIAN ASSISTANT
Payer: MEDICARE

## 2022-09-01 ENCOUNTER — OFFICE VISIT (OUTPATIENT)
Dept: ORTHOPEDICS | Facility: CLINIC | Age: 77
End: 2022-09-01
Payer: MEDICARE

## 2022-09-01 VITALS
WEIGHT: 146 LBS | HEIGHT: 63 IN | HEART RATE: 75 BPM | BODY MASS INDEX: 25.87 KG/M2 | SYSTOLIC BLOOD PRESSURE: 156 MMHG | DIASTOLIC BLOOD PRESSURE: 79 MMHG

## 2022-09-01 DIAGNOSIS — M25.572 LEFT ANKLE PAIN, UNSPECIFIED CHRONICITY: Primary | ICD-10-CM

## 2022-09-01 DIAGNOSIS — M21.6X2 PRONATION OF LEFT FOOT: ICD-10-CM

## 2022-09-01 DIAGNOSIS — M25.572 LEFT ANKLE PAIN, UNSPECIFIED CHRONICITY: ICD-10-CM

## 2022-09-01 PROCEDURE — 3078F PR MOST RECENT DIASTOLIC BLOOD PRESSURE < 80 MM HG: ICD-10-PCS | Mod: CPTII,,, | Performed by: PHYSICIAN ASSISTANT

## 2022-09-01 PROCEDURE — 99213 PR OFFICE/OUTPT VISIT, EST, LEVL III, 20-29 MIN: ICD-10-PCS | Mod: ,,, | Performed by: PHYSICIAN ASSISTANT

## 2022-09-01 PROCEDURE — 1101F PT FALLS ASSESS-DOCD LE1/YR: CPT | Mod: CPTII,,, | Performed by: PHYSICIAN ASSISTANT

## 2022-09-01 PROCEDURE — 1101F PR PT FALLS ASSESS DOC 0-1 FALLS W/OUT INJ PAST YR: ICD-10-PCS | Mod: CPTII,,, | Performed by: PHYSICIAN ASSISTANT

## 2022-09-01 PROCEDURE — 1159F PR MEDICATION LIST DOCUMENTED IN MEDICAL RECORD: ICD-10-PCS | Mod: CPTII,,, | Performed by: PHYSICIAN ASSISTANT

## 2022-09-01 PROCEDURE — 1159F MED LIST DOCD IN RCRD: CPT | Mod: CPTII,,, | Performed by: PHYSICIAN ASSISTANT

## 2022-09-01 PROCEDURE — 1160F PR REVIEW ALL MEDS BY PRESCRIBER/CLIN PHARMACIST DOCUMENTED: ICD-10-PCS | Mod: CPTII,,, | Performed by: PHYSICIAN ASSISTANT

## 2022-09-01 PROCEDURE — 73610 XR ANKLE COMPLETE 3 VIEW LEFT: ICD-10-PCS | Mod: LT,,, | Performed by: PHYSICIAN ASSISTANT

## 2022-09-01 PROCEDURE — 3078F DIAST BP <80 MM HG: CPT | Mod: CPTII,,, | Performed by: PHYSICIAN ASSISTANT

## 2022-09-01 PROCEDURE — 1160F RVW MEDS BY RX/DR IN RCRD: CPT | Mod: CPTII,,, | Performed by: PHYSICIAN ASSISTANT

## 2022-09-01 PROCEDURE — 3288F FALL RISK ASSESSMENT DOCD: CPT | Mod: CPTII,,, | Performed by: PHYSICIAN ASSISTANT

## 2022-09-01 PROCEDURE — 3288F PR FALLS RISK ASSESSMENT DOCUMENTED: ICD-10-PCS | Mod: CPTII,,, | Performed by: PHYSICIAN ASSISTANT

## 2022-09-01 PROCEDURE — 3077F SYST BP >= 140 MM HG: CPT | Mod: CPTII,,, | Performed by: PHYSICIAN ASSISTANT

## 2022-09-01 PROCEDURE — 99213 OFFICE O/P EST LOW 20 MIN: CPT | Mod: ,,, | Performed by: PHYSICIAN ASSISTANT

## 2022-09-01 PROCEDURE — 3077F PR MOST RECENT SYSTOLIC BLOOD PRESSURE >= 140 MM HG: ICD-10-PCS | Mod: CPTII,,, | Performed by: PHYSICIAN ASSISTANT

## 2022-09-01 PROCEDURE — 73610 X-RAY EXAM OF ANKLE: CPT | Mod: LT,,, | Performed by: PHYSICIAN ASSISTANT

## 2022-09-01 NOTE — LETTER
[facility]  [address] [place] [address]  Phone: [phone]  Fax: [phone]    JULIAN ESTRADA  was seen by me on 09/01/2022 and is able to return to work 09/01/22         If you should have any questions, please contact my office at [phone]        AMADOR ARROYO PA-C

## 2022-09-01 NOTE — PROGRESS NOTES
" Chief Complaint:   Chief Complaint   Patient presents with    Pain     left ankle, states it mightve started when she was standing in the position to long, has started about 08/2022, has been having some swelling and elevation,        History of present illness:    This is a 77 y.o. year old female who complains of left foot pain and some swelling.    She states that the pain is more lateral aspect of her foot.    No trauma or injury she does not recall doing any type of twisting or rolling of her ankle.          Review of Systems:    Constitution:   Denies chills, fever, and sweats.  HENT:   Denies headaches or blurry vision.  Cardiovascular:  Denies chest pain or irregular heart beat.  Respiratory:   Denies cough or shortness of breath.  Gastrointestinal:  Denies abdominal pain, nausea, or vomiting.  Musculoskeletal:   Denies muscle cramps.  Neurological:   Denies dizziness or focal weakness.  Psychiatric/Behavior: Normal mental status.  Hematology/Lymph:  Denies bleeding problem or easy bruising/bleeding.  Skin:    Denies rash or suspicious lesions.    Examination:    Vital Signs:    Vitals:    09/01/22 1020   BP: (!) 156/79   Pulse: 75   Weight: 66.2 kg (146 lb)   Height: 5' 3" (1.6 m)       Body mass index is 25.86 kg/m².    Constitution:   Well-developed, well nourished patient in no acute distress.  Neurological:   Alert and oriented x 3 and cooperative to examination.     Psychiatric/Behavior: Normal mental status.  Respiratory:   No shortness of breath.  Eyes:    Extraoccular muscles intact  Skin:    No scars, rash or suspicious lesions.    Physical Exam:   Left foot and ankle  No obvious deformity. Plantar flexion and dorsiflexion is 60 degrees and 30 degrees, respectively.  Negative squeeze test.  Negative lateral malleolus tenderness.  Negative medial malleolus tenderness.  Negative talofibular ligament tenderness.  Negative anterior draw and lateral tilt.  5/5 strength, normal skin appearance and " palpable pulses distally.  Sensibility normal.   Pes planus   Pronation of the left foot  Brisk capillary refill of the digits  Intact motor and sensation left foot and toes  Tender to palpation over the lateral aspect of the left foot at in the tarsal area  Edema of bilateral feet left greater than right    Imaging: X-rays ordered and images interpreted today personally by me of left ankle three views   No acute bony abnormalities noted  Patient has well-maintained ankle joint space.    She has mild midfoot arthritic changes but not bone-on-bone     Assessment: Left ankle pain, unspecified chronicity  -     X-Ray Ankle Complete Left; Future; Expected date: 09/01/2022    Pronation of left foot         Plan:  Patient will use over-the-counter anti-inflammatories   She is using compression sock for the edema of her foot and ankle.    She is also going to use an obtain a pair of stabilizer when she has the good arch support stabilizer foot.    Will see her back in a month if these measures work given her symptomatic relief is no needing further intervention          DISCLAIMER: This note may have been dictated using voice recognition software and may contain grammatical errors.     NOTE: Consult report sent to referring provider via [facility].

## 2022-09-22 DIAGNOSIS — I10 HYPERTENSION, UNSPECIFIED TYPE: Primary | ICD-10-CM

## 2022-09-22 RX ORDER — ATENOLOL 50 MG/1
50 TABLET ORAL DAILY
COMMUNITY
Start: 2022-06-16 | End: 2022-09-22 | Stop reason: SDUPTHER

## 2022-09-22 RX ORDER — ATENOLOL 50 MG/1
50 TABLET ORAL DAILY
Qty: 90 TABLET | Refills: 3 | Status: SHIPPED | OUTPATIENT
Start: 2022-09-22 | End: 2023-12-04 | Stop reason: SDUPTHER

## 2022-12-15 ENCOUNTER — TELEPHONE (OUTPATIENT)
Dept: INTERNAL MEDICINE | Facility: CLINIC | Age: 77
End: 2022-12-15
Payer: MEDICARE

## 2022-12-15 DIAGNOSIS — Z00.00 WELL ADULT EXAM: Primary | ICD-10-CM

## 2022-12-15 DIAGNOSIS — I10 HYPERTENSION, UNSPECIFIED TYPE: ICD-10-CM

## 2022-12-16 ENCOUNTER — LAB VISIT (OUTPATIENT)
Dept: LAB | Facility: HOSPITAL | Age: 77
End: 2022-12-16
Attending: INTERNAL MEDICINE
Payer: MEDICARE

## 2022-12-16 DIAGNOSIS — Z00.00 WELL ADULT EXAM: ICD-10-CM

## 2022-12-16 DIAGNOSIS — I10 HYPERTENSION, UNSPECIFIED TYPE: ICD-10-CM

## 2022-12-16 LAB
ALBUMIN SERPL-MCNC: 3.9 G/DL (ref 3.4–4.8)
ALBUMIN/GLOB SERPL: 1.4 RATIO (ref 1.1–2)
ALP SERPL-CCNC: 112 UNIT/L (ref 40–150)
ALT SERPL-CCNC: 6 UNIT/L (ref 0–55)
APPEARANCE UR: CLEAR
AST SERPL-CCNC: 11 UNIT/L (ref 5–34)
BACTERIA #/AREA URNS AUTO: ABNORMAL /HPF
BASOPHILS # BLD AUTO: 0.02 X10(3)/MCL (ref 0–0.2)
BASOPHILS NFR BLD AUTO: 0.3 %
BILIRUB UR QL STRIP.AUTO: NEGATIVE MG/DL
BILIRUBIN DIRECT+TOT PNL SERPL-MCNC: 0.6 MG/DL
BUN SERPL-MCNC: 13.7 MG/DL (ref 9.8–20.1)
CALCIUM SERPL-MCNC: 9.8 MG/DL (ref 8.4–10.2)
CHLORIDE SERPL-SCNC: 108 MMOL/L (ref 98–107)
CHOLEST SERPL-MCNC: 202 MG/DL
CHOLEST/HDLC SERPL: 4 {RATIO} (ref 0–5)
CO2 SERPL-SCNC: 28 MMOL/L (ref 23–31)
COLOR UR AUTO: YELLOW
CREAT SERPL-MCNC: 0.87 MG/DL (ref 0.55–1.02)
EOSINOPHIL # BLD AUTO: 0.25 X10(3)/MCL (ref 0–0.9)
EOSINOPHIL NFR BLD AUTO: 3.2 %
ERYTHROCYTE [DISTWIDTH] IN BLOOD BY AUTOMATED COUNT: 12.6 % (ref 11–14.5)
GFR SERPLBLD CREATININE-BSD FMLA CKD-EPI: >60 MLS/MIN/1.73/M2
GLOBULIN SER-MCNC: 2.8 GM/DL (ref 2.4–3.5)
GLUCOSE SERPL-MCNC: 89 MG/DL (ref 82–115)
GLUCOSE UR QL STRIP.AUTO: NEGATIVE MG/DL
HCT VFR BLD AUTO: 40.2 % (ref 37–47)
HDLC SERPL-MCNC: 53 MG/DL (ref 35–60)
HGB BLD-MCNC: 12.4 GM/DL (ref 12–16)
IMM GRANULOCYTES # BLD AUTO: 0.03 X10(3)/MCL (ref 0–0.04)
IMM GRANULOCYTES NFR BLD AUTO: 0.4 %
KETONES UR QL STRIP.AUTO: NEGATIVE MG/DL
LDLC SERPL CALC-MCNC: 134 MG/DL (ref 50–140)
LEUKOCYTE ESTERASE UR QL STRIP.AUTO: ABNORMAL UNIT/L
LYMPHOCYTES # BLD AUTO: 1.87 X10(3)/MCL (ref 0.6–4.6)
LYMPHOCYTES NFR BLD AUTO: 24.1 %
MCH RBC QN AUTO: 26.6 PG
MCHC RBC AUTO-ENTMCNC: 30.8 MG/DL (ref 33–36)
MCV RBC AUTO: 86.3 FL (ref 80–94)
MONOCYTES # BLD AUTO: 0.57 X10(3)/MCL (ref 0.1–1.3)
MONOCYTES NFR BLD AUTO: 7.3 %
NEUTROPHILS # BLD AUTO: 5.03 X10(3)/MCL (ref 2.1–9.2)
NEUTROPHILS NFR BLD AUTO: 64.7 %
NITRITE UR QL STRIP.AUTO: NEGATIVE
NRBC BLD AUTO-RTO: 0 % (ref 0–1)
PH UR STRIP.AUTO: 6 [PH]
PLATELET # BLD AUTO: 305 X10(3)/MCL (ref 140–371)
PMV BLD AUTO: 10.8 FL (ref 9.4–12.4)
POTASSIUM SERPL-SCNC: 5 MMOL/L (ref 3.5–5.1)
PROT SERPL-MCNC: 6.7 GM/DL (ref 5.8–7.6)
PROT UR QL STRIP.AUTO: ABNORMAL MG/DL
RBC # BLD AUTO: 4.66 X10(6)/MCL (ref 4.2–5.4)
RBC #/AREA URNS AUTO: <5 /HPF
RBC UR QL AUTO: NEGATIVE UNIT/L
SODIUM SERPL-SCNC: 144 MMOL/L (ref 136–145)
SP GR UR STRIP.AUTO: 1.02 (ref 1–1.03)
SQUAMOUS #/AREA URNS AUTO: <5 /HPF
T3FREE SERPL-MCNC: 3.38 PG/ML (ref 1.57–3.91)
T4 FREE SERPL-MCNC: 1.37 NG/DL (ref 0.7–1.48)
TRIGL SERPL-MCNC: 73 MG/DL (ref 37–140)
TSH SERPL-ACNC: <0.008 UIU/ML (ref 0.35–4.94)
UROBILINOGEN UR STRIP-ACNC: 0.2 MG/DL
VLDLC SERPL CALC-MCNC: 15 MG/DL
WBC # SPEC AUTO: 7.8 X10(3)/MCL (ref 4.5–11.5)
WBC #/AREA URNS AUTO: 24 /HPF

## 2022-12-16 PROCEDURE — 81001 URINALYSIS AUTO W/SCOPE: CPT

## 2022-12-16 PROCEDURE — 84443 ASSAY THYROID STIM HORMONE: CPT

## 2022-12-16 PROCEDURE — 81003 URINALYSIS AUTO W/O SCOPE: CPT

## 2022-12-16 PROCEDURE — 87088 URINE BACTERIA CULTURE: CPT

## 2022-12-16 PROCEDURE — 36415 COLL VENOUS BLD VENIPUNCTURE: CPT

## 2022-12-16 PROCEDURE — 85025 COMPLETE CBC W/AUTO DIFF WBC: CPT

## 2022-12-16 PROCEDURE — 80061 LIPID PANEL: CPT

## 2022-12-16 PROCEDURE — 84481 FREE ASSAY (FT-3): CPT

## 2022-12-16 PROCEDURE — 84439 ASSAY OF FREE THYROXINE: CPT

## 2022-12-16 PROCEDURE — 80053 COMPREHEN METABOLIC PANEL: CPT

## 2022-12-18 LAB — BACTERIA UR CULT: NORMAL

## 2023-01-04 ENCOUNTER — TELEPHONE (OUTPATIENT)
Dept: INTERNAL MEDICINE | Facility: CLINIC | Age: 78
End: 2023-01-04
Payer: MEDICARE

## 2023-01-04 DIAGNOSIS — Z00.00 WELL ADULT EXAM: Primary | ICD-10-CM

## 2023-01-04 RX ORDER — AZELASTINE 1 MG/ML
2 SPRAY, METERED NASAL 2 TIMES DAILY
Qty: 30 ML | Refills: 0 | Status: SHIPPED | OUTPATIENT
Start: 2023-01-04 | End: 2023-04-06

## 2023-01-04 RX ORDER — FLUTICASONE PROPIONATE 50 MCG
1 SPRAY, SUSPENSION (ML) NASAL 2 TIMES DAILY
Qty: 30 ML | Refills: 0 | Status: SHIPPED | OUTPATIENT
Start: 2023-01-04 | End: 2023-02-03

## 2023-01-04 NOTE — TELEPHONE ENCOUNTER
Swollen tonsils has some stones on them. Was given Augmentin for 10 days. Trouble swallowing at times. Left tonsil hurts when eating. No fever. Advised gargling. Any other suggestions or prescribe something else?

## 2023-01-04 NOTE — TELEPHONE ENCOUNTER
Left message with patient at this time explaining dr. Gordon's recommendations. Nasal sprays sent to pharmacy as well.

## 2023-01-04 NOTE — TELEPHONE ENCOUNTER
She should finish the course of antibiotic.  However, because she has not gotten relief yet, this is likely allergy and sinus related.  The treatment is Tylenol 1000 mg twice a day for 1 week, Flonase twice a day for a month, and azelastine twice a day for 1 month

## 2023-01-04 NOTE — TELEPHONE ENCOUNTER
----- Message from Catherine Alvarado sent at 1/4/2023  9:35 AM CST -----  Regarding: appt  Pt is req a callback to discuss sore throat.  [facility].  They put her on an antibiotic.  Not any better.  She has tongue stones (white plaque build up maybe its that, hurts to touch.  [phone]  [facility]u [facility]

## 2023-02-22 DIAGNOSIS — I10 HYPERTENSION, UNSPECIFIED TYPE: Primary | ICD-10-CM

## 2023-02-22 RX ORDER — AMLODIPINE BESYLATE 10 MG/1
10 TABLET ORAL DAILY
Qty: 90 TABLET | Refills: 3 | Status: SHIPPED | OUTPATIENT
Start: 2023-02-22

## 2023-03-01 PROBLEM — Z00.00 WELLNESS EXAMINATION: Status: ACTIVE | Noted: 2023-03-01

## 2023-03-01 PROBLEM — I10 PRIMARY HYPERTENSION: Status: ACTIVE | Noted: 2023-03-01

## 2023-03-01 PROBLEM — R79.89 LOW TSH LEVEL: Status: ACTIVE | Noted: 2023-03-01

## 2023-03-01 PROBLEM — E78.5 DYSLIPIDEMIA: Status: ACTIVE | Noted: 2023-03-01

## 2023-03-28 ENCOUNTER — HOSPITAL ENCOUNTER (OUTPATIENT)
Dept: RADIOLOGY | Facility: CLINIC | Age: 78
Discharge: HOME | End: 2023-03-28
Attending: SPECIALIST
Payer: MEDICARE

## 2023-03-28 ENCOUNTER — OFFICE VISIT (OUTPATIENT)
Dept: ORTHOPEDICS | Facility: CLINIC | Age: 78
End: 2023-03-28
Payer: MEDICARE

## 2023-03-28 VITALS
HEART RATE: 75 BPM | DIASTOLIC BLOOD PRESSURE: 81 MMHG | WEIGHT: 146 LBS | BODY MASS INDEX: 25.87 KG/M2 | HEIGHT: 63 IN | SYSTOLIC BLOOD PRESSURE: 143 MMHG

## 2023-03-28 DIAGNOSIS — M17.12 PRIMARY LOCALIZED OSTEOARTHROSIS OF THE KNEE, LEFT: ICD-10-CM

## 2023-03-28 DIAGNOSIS — M25.562 LEFT KNEE PAIN, UNSPECIFIED CHRONICITY: Primary | ICD-10-CM

## 2023-03-28 DIAGNOSIS — M25.562 LEFT KNEE PAIN, UNSPECIFIED CHRONICITY: ICD-10-CM

## 2023-03-28 PROCEDURE — 1160F PR REVIEW ALL MEDS BY PRESCRIBER/CLIN PHARMACIST DOCUMENTED: ICD-10-PCS | Mod: CPTII,,, | Performed by: SPECIALIST

## 2023-03-28 PROCEDURE — 1159F PR MEDICATION LIST DOCUMENTED IN MEDICAL RECORD: ICD-10-PCS | Mod: CPTII,,, | Performed by: SPECIALIST

## 2023-03-28 PROCEDURE — 3079F DIAST BP 80-89 MM HG: CPT | Mod: CPTII,,, | Performed by: SPECIALIST

## 2023-03-28 PROCEDURE — 20610 DRAIN/INJ JOINT/BURSA W/O US: CPT | Mod: LT,,, | Performed by: SPECIALIST

## 2023-03-28 PROCEDURE — 3077F PR MOST RECENT SYSTOLIC BLOOD PRESSURE >= 140 MM HG: ICD-10-PCS | Mod: CPTII,,, | Performed by: SPECIALIST

## 2023-03-28 PROCEDURE — 3077F SYST BP >= 140 MM HG: CPT | Mod: CPTII,,, | Performed by: SPECIALIST

## 2023-03-28 PROCEDURE — 99214 PR OFFICE/OUTPT VISIT, EST, LEVL IV, 30-39 MIN: ICD-10-PCS | Mod: 25,,, | Performed by: SPECIALIST

## 2023-03-28 PROCEDURE — 73564 XR KNEE COMP 4 OR MORE VIEWS LEFT: ICD-10-PCS | Mod: LT,,, | Performed by: SPECIALIST

## 2023-03-28 PROCEDURE — 99214 OFFICE O/P EST MOD 30 MIN: CPT | Mod: 25,,, | Performed by: SPECIALIST

## 2023-03-28 PROCEDURE — 3079F PR MOST RECENT DIASTOLIC BLOOD PRESSURE 80-89 MM HG: ICD-10-PCS | Mod: CPTII,,, | Performed by: SPECIALIST

## 2023-03-28 PROCEDURE — 1160F RVW MEDS BY RX/DR IN RCRD: CPT | Mod: CPTII,,, | Performed by: SPECIALIST

## 2023-03-28 PROCEDURE — 20610 LARGE JOINT ASPIRATION/INJECTION: L KNEE: ICD-10-PCS | Mod: LT,,, | Performed by: SPECIALIST

## 2023-03-28 PROCEDURE — 1159F MED LIST DOCD IN RCRD: CPT | Mod: CPTII,,, | Performed by: SPECIALIST

## 2023-03-28 PROCEDURE — 73564 X-RAY EXAM KNEE 4 OR MORE: CPT | Mod: LT,,, | Performed by: SPECIALIST

## 2023-03-28 RX ORDER — BETAMETHASONE SODIUM PHOSPHATE AND BETAMETHASONE ACETATE 3; 3 MG/ML; MG/ML
12 INJECTION, SUSPENSION INTRA-ARTICULAR; INTRALESIONAL; INTRAMUSCULAR; SOFT TISSUE
Status: DISCONTINUED | OUTPATIENT
Start: 2023-03-28 | End: 2023-03-28 | Stop reason: HOSPADM

## 2023-03-28 RX ADMIN — BETAMETHASONE SODIUM PHOSPHATE AND BETAMETHASONE ACETATE 12 MG: 3; 3 INJECTION, SUSPENSION INTRA-ARTICULAR; INTRALESIONAL; INTRAMUSCULAR; SOFT TISSUE at 03:03

## 2023-03-28 NOTE — PROGRESS NOTES
Chief Complaint:   Chief Complaint   Patient presents with    Left Knee - Pain, Swelling    Knee Pain     pt having Lt knee pain and swelling, no prior injury. had arthoscopic sx 37 yrs ago, swelling started week and a half ago. not taking pain meds. pt using elevation not helping.          History of present illness:    This is a 77 y.o. year old female who complains of Knee symptoms  for 1 1/2 weeks ::knee gives way  Knee joint pain on the left  Worse with weightbearing  Worse in the morning   Slowly worsens with extended activity  Is increased by bending it  By twisting it  By kneeling  With stairs  By squatting   Knee joint swelling on the left posteriorly  Medially  Laterally °  Knee joint pain not improved by rest ° Not by ice ° No clicking sensation in the left knee ° No grating sensation in the left knee   ° The knee did not suddenly buckle due to contact ° No popping sound was heard in the knee   ° No tingling ° No burning sensation         History reviewed. No pertinent past medical history.    Past Surgical History:   Procedure Laterality Date    COLONOSCOPY  12/18/2017    KNEE ARTHROSCOPY Left     KNEE SURGERY      RIGHT HEMICOLECTOMY      THYROIDECTOMY, PARTIAL      TUBAL LIGATION         Current Outpatient Medications   Medication Instructions    amLODIPine (NORVASC) 10 mg, Oral, Daily    atenoloL (TENORMIN) 50 mg, Oral, Daily    azelastine (ASTELIN) 274 mcg, Nasal, 2 times daily    latanoprost 0.005 % ophthalmic solution 1 drop, Both Eyes, Nightly    losartan-hydrochlorothiazide 100-12.5 mg (HYZAAR) 100-12.5 mg Tab 1 tablet, Oral, Daily    travoprost (TRAVATAN Z) 0.004 % ophthalmic solution No dose, route, or frequency recorded.    vitamin D (VITAMIN D3) 3,000 Units, Oral, Daily        Review of patient's allergies indicates:   Allergen Reactions    Ace inhibitors      Other reaction(s): tongue swelling       Family History   Problem Relation Age of Onset    Colon cancer Mother     Hypertension Mother  "           Review of Systems:    Constitution:   Denies chills, fever, and sweats.  HENT:   Denies headaches or blurry vision.  Cardiovascular:  Denies chest pain or irregular heart beat.  Respiratory:   Denies cough or shortness of breath.  Gastrointestinal:  Denies abdominal pain, nausea, or vomiting.  Musculoskeletal:   Denies muscle cramps.  Neurological:   Denies dizziness or focal weakness.  Psychiatric/Behavior: Normal mental status.  Hematology/Lymph:  Denies bleeding problem or easy bruising/bleeding.  Skin:    Denies rash or suspicious lesions.    Examination:    Vital Signs:    Vitals:    03/28/23 1447   BP: (!) 143/81   Pulse: 75   Weight: 66.2 kg (146 lb)   Height: 5' 3" (1.6 m)       Body mass index is 25.86 kg/m².    Constitution:   Well-developed, well nourished patient in no acute distress.  Neurological:   Alert and oriented x 3 and cooperative to examination.     Psychiatric/Behavior: Normal mental status.  Respiratory:   No shortness of breath.  Eyes:    Extraoccular muscles intact  Skin:    No scars, rash or suspicious lesions.    Musculoskeletal Exam :      PHYSICAL FINDINGS  Cardiovascular:  Arterial Pulses: ° Dorsalis pedis pulses were normal right. ° Dorsalis pedis pulses were normal left.  Musculoskeletal System:  Hips:  General/bilateral: ° No swelling of the hips. ° No induration of the hips.  Right Hip: ° Motion was normal. ° No pain was elicited by active internal rotation with the hip flexed.  ° No pain was elicited by active external rotation with the hip flexed. ° No pain was elicited by active motion. ° No pain was elicited by passive motion.  Left Hip: ° Motion was normal.  Left Knee: ° Examined. ° Positive effusion. °Localized swelling. °Genu varum. °Patella demonstrated crepitus. °Anteromedial aspect was tender on palpation. °Medial aspect was tender on palpation.  patella  Left Knee:  Knee Motion: Value   Active flexion   110   degrees  Active extension   10   degrees    left " knee ° Pain was elicited throughout the range of motion. ° Swelling with a negative fluctuation test. ° No erythema. ° No warmth. ° Anterior aspect was not tender on palpation. ° Patellofemoral region was not tender on palpation. ° Medial collateral ligament was not tender on palpation. ° Lateral collateral ligament was not tender on palpation. ° No pain was elicited by motion using [place]'s apprehension test. ° No laxity of the posterior cruciate ligament. ° No anterior drawer sign was present. ° No one plane medial (straight) instability. ° No one plane lateral (straight) instability. ° A Lachman test did not demonstrate one plane anterior instability. ° Rubio's sign was not observed for chondromalacia.    Right Knee:  Knee Motion: Value Normal Range  Active flexion    125   degrees  Active extension   0    degrees    Foot:  Left Foot: ° No excessive pronation. ° No excessive supination.  Functional Exam:  General/bilateral: ° Ambulation did not require a cane. ° Ambulation did not require a walker.  Neurological:  Sensation: ° No sensory exam abnormalities were noted.  Motor: ° Strength was normal. ° No weakness of the right hip was observed. ° No weakness of the left hip was observed.  Gait and Stance: ° A left-sided antalgic gait was observed.  Skin:  Injury / Incision Site: ° Left knee showed no tissue injury scar.      TESTS  Imaging:  X-Ray Knee:  AP and lateral view x-rays of the left knee with sunrise view of the patella were performed -of left knee.    IMPRESSIONS RADIOLOGY TEST     Assessment: Left knee pain, unspecified chronicity  -     X-Ray Knee Complete 4 or More Views Left; Future; Expected date: 03/28/2023      This patient had four views taken of her left knee which shows advanced osteoarthritis with joint space narrowing and sclerosis.  Plan:  We will try.  If rest also she will have an injection today of lidocaine and Celestone into the left      DISCLAIMER: This note may have been dictated  using voice recognition software and may contain grammatical errors.     NOTE: Consult report sent to referring provider via [facility].

## 2023-03-28 NOTE — PROCEDURES
Large Joint Aspiration/Injection: L knee    Date/Time: 3/28/2023 3:00 PM  Performed by: Rivera Franco MD  Authorized by: Rivera Franco MD     Consent Done?:  Yes (Verbal)  Indications:  Arthritis  Timeout: prior to procedure the correct patient, procedure, and site was verified    Prep: patient was prepped and draped in usual sterile fashion      Local anesthesia used?: Yes    Local anesthetic:  Lidocaine 2% without epinephrine    Details:  Needle Size:  25 G  Ultrasonic Guidance for needle placement?: No    Location:  Knee  Site:  L knee  Medications:  12 mg betamethasone acetate-betamethasone sodium phosphate 6 mg/mL  Patient tolerance:  Patient tolerated the procedure well with no immediate complications

## 2023-04-06 ENCOUNTER — OFFICE VISIT (OUTPATIENT)
Dept: INTERNAL MEDICINE | Facility: CLINIC | Age: 78
End: 2023-04-06
Payer: MEDICARE

## 2023-04-06 VITALS
WEIGHT: 150 LBS | SYSTOLIC BLOOD PRESSURE: 128 MMHG | HEIGHT: 63 IN | HEART RATE: 66 BPM | OXYGEN SATURATION: 97 % | DIASTOLIC BLOOD PRESSURE: 86 MMHG | TEMPERATURE: 98 F | RESPIRATION RATE: 16 BRPM | BODY MASS INDEX: 26.58 KG/M2

## 2023-04-06 DIAGNOSIS — T46.6X5A STATIN-INDUCED MYOSITIS: ICD-10-CM

## 2023-04-06 DIAGNOSIS — I10 PRIMARY HYPERTENSION: ICD-10-CM

## 2023-04-06 DIAGNOSIS — R79.89 LOW TSH LEVEL: ICD-10-CM

## 2023-04-06 DIAGNOSIS — M60.9 STATIN-INDUCED MYOSITIS: ICD-10-CM

## 2023-04-06 DIAGNOSIS — E78.5 DYSLIPIDEMIA: ICD-10-CM

## 2023-04-06 DIAGNOSIS — Z00.00 WELLNESS EXAMINATION: Primary | ICD-10-CM

## 2023-04-06 PROCEDURE — 1159F PR MEDICATION LIST DOCUMENTED IN MEDICAL RECORD: ICD-10-PCS | Mod: CPTII,,, | Performed by: INTERNAL MEDICINE

## 2023-04-06 PROCEDURE — 3288F FALL RISK ASSESSMENT DOCD: CPT | Mod: CPTII,,, | Performed by: INTERNAL MEDICINE

## 2023-04-06 PROCEDURE — 1101F PT FALLS ASSESS-DOCD LE1/YR: CPT | Mod: CPTII,,, | Performed by: INTERNAL MEDICINE

## 2023-04-06 PROCEDURE — 3079F PR MOST RECENT DIASTOLIC BLOOD PRESSURE 80-89 MM HG: ICD-10-PCS | Mod: CPTII,,, | Performed by: INTERNAL MEDICINE

## 2023-04-06 PROCEDURE — 3074F SYST BP LT 130 MM HG: CPT | Mod: CPTII,,, | Performed by: INTERNAL MEDICINE

## 2023-04-06 PROCEDURE — 1160F PR REVIEW ALL MEDS BY PRESCRIBER/CLIN PHARMACIST DOCUMENTED: ICD-10-PCS | Mod: CPTII,,, | Performed by: INTERNAL MEDICINE

## 2023-04-06 PROCEDURE — 3079F DIAST BP 80-89 MM HG: CPT | Mod: CPTII,,, | Performed by: INTERNAL MEDICINE

## 2023-04-06 PROCEDURE — G0439 PPPS, SUBSEQ VISIT: HCPCS | Mod: ,,, | Performed by: INTERNAL MEDICINE

## 2023-04-06 PROCEDURE — G0439 PR MEDICARE ANNUAL WELLNESS SUBSEQUENT VISIT: ICD-10-PCS | Mod: ,,, | Performed by: INTERNAL MEDICINE

## 2023-04-06 PROCEDURE — 1101F PR PT FALLS ASSESS DOC 0-1 FALLS W/OUT INJ PAST YR: ICD-10-PCS | Mod: CPTII,,, | Performed by: INTERNAL MEDICINE

## 2023-04-06 PROCEDURE — 1159F MED LIST DOCD IN RCRD: CPT | Mod: CPTII,,, | Performed by: INTERNAL MEDICINE

## 2023-04-06 PROCEDURE — 1160F RVW MEDS BY RX/DR IN RCRD: CPT | Mod: CPTII,,, | Performed by: INTERNAL MEDICINE

## 2023-04-06 PROCEDURE — 3074F PR MOST RECENT SYSTOLIC BLOOD PRESSURE < 130 MM HG: ICD-10-PCS | Mod: CPTII,,, | Performed by: INTERNAL MEDICINE

## 2023-04-06 PROCEDURE — 3288F PR FALLS RISK ASSESSMENT DOCUMENTED: ICD-10-PCS | Mod: CPTII,,, | Performed by: INTERNAL MEDICINE

## 2023-04-06 NOTE — PROGRESS NOTES
Subjective:       Patient ID: Ciarra Daniel is a 77 y.o. female.      Patient Care Team:  Anand Gordon II, MD as PCP - General (Internal Medicine)    Chief Complaint: Medicare AWV Follow Up, Hypertension, and Hyperlipidemia    77-year-old female seen today for followup of hypertension, hyperlipidemia, obesity, and colonic lesion.    Review of Systems   Constitutional:  Negative for fever.   HENT:  Negative for nosebleeds.    Eyes:  Negative for visual disturbance.   Respiratory:  Negative for shortness of breath.    Cardiovascular:  Negative for chest pain.   Gastrointestinal:  Negative for abdominal pain.   Genitourinary:  Negative for dysuria.   Musculoskeletal:  Negative for gait problem.   Neurological:  Negative for headaches.         Patient Reported Health Risk Assessment  What [age] your [age] 70-79  Are you male or female?: Female  During the past four weeks, how much have you been bothered by emotional problems such as feeling anxious, depressed, irritable, sad, or downhearted and blue?: Not at all  During the past five weeks, has your physical and/or emotional health limited your social activities with family, friends, neighbors, or groups?: Not at all  During the past four weeks, how much bodily pain have you generally had?: Mild pain  During the past four weeks, was someone available to help if you needed and wanted help?: Yes, as much as I wanted  During the past four weeks, what was the hardest physical activity you could do for at least two minutes?: Moderate  Can you get to places out of walking distance without help?  (For example, can you travel alone on buses or taxis, or drive your own car?): Yes  Can you go shopping for groceries or clothes without someone's help?: Yes  Can you prepare your own meals?: Yes  Can you do your own housework without help?: Yes  Because of any health problems, do you need the help of another person with your personal care needs such as eating, bathing,  dressing, or getting around the house?: No  Can you handle your own money without help?: Yes  During the past four weeks, how would you rate your health in general?: Good  How have things been going for you during the past four weeks?: Pretty well  Are you having difficulties driving your car?: No  Do you always fasten your seat belt when you are in a car?: Yes, usually  How often in the past four weeks have you been bothered by falling or dizzy when standing up?: Never  How often in the past four weeks have you been bothered by sexual problems?: Never  How often in the past four weeks have you been bothered by trouble eating well?: Never  How often in the past four weeks have you been bothered by teeth or denture problems?: Never  How often in the past four weeks have you been bothered with problems using the telephone?: Never  How often in the past four weeks have you been bothered by tiredness or fatigue?: Never  Have you fallen two or more times in the past year?: No  Are you afraid of falling?: No  Are you a smoker?: No  During the past four weeks, how many drinks of wine, beer, or other alcoholic beverages did you have?: No alcohol at all  Do you exercise for about 20 minutes three or more days a week?: Yes, some of the time  Have you been given any information to help you with hazards in your house that might hurt you?: No  Have you been given any information to help you with keeping track of your medications?: No  How often do you have trouble taking medicines the way you've been told to take them?: I always take them as prescribed  How confident are you that you can control and manage most of your health problems?: Somewhat confident  What is your race? (Check all that apply.):       Objective:      Physical Exam  HENT:      Head: Normocephalic.      Mouth/Throat:      Pharynx: Oropharynx is clear.   Eyes:      Extraocular Movements: Extraocular movements intact.   Cardiovascular:      Rate  "and Rhythm: Normal rate.   Pulmonary:      Breath sounds: Normal breath sounds.   Abdominal:      Palpations: Abdomen is soft.   Musculoskeletal:         General: No swelling.   Skin:     General: Skin is warm.   Neurological:      General: No focal deficit present.      Mental Status: She is alert and oriented to person, place, and time.   Psychiatric:         Mood and Affect: Mood normal.       Vitals:    04/06/23 1437   BP: 128/86   Pulse: 66   Resp: 16   Temp: 98 °F (36.7 °C)   SpO2: 97%   Weight: 68 kg (150 lb)   Height: 5' 3" (1.6 m)            No flowsheet data found.  Fall Risk Assessment - Outpatient 4/6/2023 3/28/2023 9/1/2022   Mobility Status Ambulatory Ambulatory Ambulatory   Number of falls 0 - 0   Identified as fall risk 0 - 0           Depression Screening  Over the past two weeks, has the patient felt down, depressed, or hopeless?: No  Over the past two weeks, has the patient felt little interest or pleasure in doing things?: No  Functional Ability/Safety Screening  Was the patient's timed Up & Go test unsteady or longer than 30 seconds?: No  Does the patient need help with phone, transportation, shopping, preparing meals, housework, laundry, meds, or managing money?: No  Does the patient's home have rugs in the hallway, lack grab bars in the bathroom, lack handrails on the stairs or have poor lighting?: No  Have you noticed any hearing difficulties?: No  Cognitive Function (Assessed through direct observation with due consideration of information obtained by way of patient reports and/or concerns raised by family, friends, caretakers, or others)    Does the patient repeat questions/statements in the same day?: No  Does the patient have trouble remembering the date, year, and time?: No  Does the patient have difficulty managing finances?: No  Does the patient have a decreased sense of direction?: No      Assessment:       Problem List Items Addressed This Visit          Cardiac/Vascular    Primary " hypertension    Relevant Orders    CBC Auto Differential    Comprehensive Metabolic Panel    Lipid Panel    Urinalysis, Reflex to Urine Culture    T4, Free    TSH    Prolactin    Dyslipidemia    Relevant Orders    CBC Auto Differential    Comprehensive Metabolic Panel    Lipid Panel    Urinalysis, Reflex to Urine Culture    T4, Free    TSH    Prolactin       Endocrine    Low TSH level    Relevant Orders    CBC Auto Differential    Comprehensive Metabolic Panel    Lipid Panel    Urinalysis, Reflex to Urine Culture    T4, Free    TSH    Prolactin       Other    Wellness examination - Primary    Relevant Orders    CBC Auto Differential    Comprehensive Metabolic Panel    Lipid Panel    Urinalysis, Reflex to Urine Culture    T4, Free    TSH    Prolactin    Statin-induced myositis    Relevant Orders    CBC Auto Differential    Comprehensive Metabolic Panel    Lipid Panel    Urinalysis, Reflex to Urine Culture    T4, Free    TSH    Prolactin       Medication List with Changes/Refills   Current Medications    AMLODIPINE (NORVASC) 10 MG TABLET    Take 1 tablet (10 mg total) by mouth Daily.    ATENOLOL (TENORMIN) 50 MG TABLET    Take 1 tablet (50 mg total) by mouth once daily.    LATANOPROST 0.005 % OPHTHALMIC SOLUTION    Place 1 drop into both eyes every evening.    LOSARTAN-HYDROCHLOROTHIAZIDE 100-12.5 MG (HYZAAR) 100-12.5 MG TAB    Take 1 tablet by mouth once daily.    TRAVOPROST (TRAVATAN Z) 0.004 % OPHTHALMIC SOLUTION        VITAMIN D (VITAMIN D3) 1000 UNITS TAB    Take 3,000 Units by mouth Daily.   Discontinued Medications    AZELASTINE (ASTELIN) 137 MCG (0.1 %) NASAL SPRAY    2 sprays (274 mcg total) by Nasal route 2 (two) times daily. for 14 days        Plan:       1. Hypertension: Stable    2. Hyperlipidemia: We ordered Ca score in the past but was unable to have it done. Myalgia with statins.     3. Colon polyp: She had a right hemicolectomy in 2013. She denies abdominal pain or other issues. She was scheduled for  C-scope this year, but she cancelled it; maybe later this year    4. Skin lesions: She has numerous skin tags on the [place] of her neck removed in 2020    5. Osteoarthritis: Followed by orthopedics. Avoid meloxicam because of hypertension. We discussed Tylenol    6. Low TSH level: She has had this for years. She is asymptomatic. Check prolactin. We will simply monitor    7.  Paresthesia: Left hand numbness and tingling, saw orthopedics, likely carpal tunnel    8. Wellness: [facility] 10/2021, refer. 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 She was , but now lives alone. She has 5 children. Her son lived in [place], but just returned to the [place]  She lives with her daughter, Nancy.      Medicare Annual Wellness and Personalized Prevention Plan:   Fall Risk + Home Safety + Hearing Impairment + Depression Screen + Cognitive Impairment Screen + Health Risk Assessment all reviewed    Health Maintenance Topics with due status: Not Due       Topic Last Completion Date    Lipid Panel 12/16/2022      The patient's Health Maintenance was reviewed and the following appears to be due at this time:   Health Maintenance Due   Topic Date Due    Hepatitis C Screening  Never done    TETANUS VACCINE  Never done    DEXA Scan  Never done    Shingles Vaccine (1 of 2) Never done    Pneumococcal Vaccines (Age 65+) (1 - PCV) Never done    COVID-19 Vaccine (4 - Booster for Pfizer series) 01/25/2022    Influenza Vaccine (1) Never done       Advance Care Planning   I attest to discussing Advance Care Planning with patient and/or family member.  Education was provided including the importance of the Health Care Power of , Advance Directives, and/or [place] documentation.  The patient expressed understanding to the importance of this information and discussion.  Length of ACP conversation in minutes: 1       Opioid Screening:  Patient medication list reviewed, patient is not taking prescription opioids. Patient is not using additional opioids than prescribed. Patient is at low risk of substance abuse based on this opioid use history.     No follow-ups on file. In addition to their scheduled follow up, the patient has also been instructed to follow up on as needed basis.

## 2023-04-19 ENCOUNTER — TELEPHONE (OUTPATIENT)
Dept: INTERNAL MEDICINE | Facility: CLINIC | Age: 78
End: 2023-04-19

## 2023-04-19 NOTE — TELEPHONE ENCOUNTER
----- Message from Nicole Valentine sent at 4/19/2023  9:06 AM CDT -----  .Type:  Needs Medical Advice    Who Called: pt  Symptoms (please be specific): dizzy   How long has patient had these symptoms:  a week  Pharmacy name and phone #:  [id number] - [place] - [address] [facility] [address]  Would the patient rather a call back or a response via [facility]? Call back   Best Call Back Number: [phone]  Additional Information: pt is requesting a call back states one of her medications is making her dizzy she's not sure which  one it is .. please call

## 2023-04-19 NOTE — TELEPHONE ENCOUNTER
Spoke with patient at this time. She feels dizzy when taking her meds thinks her BP may be too low. Advised to chart it bid x 1-2 weeks and get back with us. May switch up how she takes meds: ie norvasc in morning and losartan/hctz at noon etc. Advised to monitor HR as well and let us know.

## 2023-04-20 ENCOUNTER — TELEPHONE (OUTPATIENT)
Dept: INTERNAL MEDICINE | Facility: CLINIC | Age: 78
End: 2023-04-20
Payer: MEDICARE

## 2023-04-20 DIAGNOSIS — Z00.00 WELLNESS EXAMINATION: Primary | ICD-10-CM

## 2023-04-20 RX ORDER — FLUTICASONE PROPIONATE 50 MCG
1 SPRAY, SUSPENSION (ML) NASAL 2 TIMES DAILY
Qty: 16 G | Refills: 1 | Status: SHIPPED | OUTPATIENT
Start: 2023-04-20 | End: 2023-05-20

## 2023-04-20 RX ORDER — AZELASTINE 1 MG/ML
1 SPRAY, METERED NASAL 2 TIMES DAILY
Qty: 30 ML | Refills: 1 | Status: SHIPPED | OUTPATIENT
Start: 2023-04-20 | End: 2023-09-06

## 2023-04-20 NOTE — TELEPHONE ENCOUNTER
This is allergy related, as everyone else is calling with similar symptoms.  Flonase and azelastine twice a day for 1 month

## 2023-05-16 ENCOUNTER — TELEPHONE (OUTPATIENT)
Dept: INTERNAL MEDICINE | Facility: CLINIC | Age: 78
End: 2023-05-16
Payer: MEDICARE

## 2023-05-16 DIAGNOSIS — J34.9 SINUS TROUBLE: Primary | ICD-10-CM

## 2023-05-16 RX ORDER — AZITHROMYCIN 500 MG/1
500 TABLET, FILM COATED ORAL DAILY
Qty: 5 TABLET | Refills: 0 | Status: SHIPPED | OUTPATIENT
Start: 2023-05-16 | End: 2023-05-21

## 2023-05-16 NOTE — TELEPHONE ENCOUNTER
----- Message from Nicole Valentine sent at 5/16/2023  8:54 AM CDT -----  .Type:  Needs Medical Advice    Who Called: pt  Symptoms (please be specific): cough and nasal drip     How long has patient had these symptoms:  two weeks  Pharmacy name and phone #:  [id number] - [place] - [address] AT [facility] [address]  Would the patient rather a call back or a response via [facility]? Call back   Best Call Back Number: [phone]  Additional Information: pt has a cough for a week or two and a nasal drip.. please call

## 2023-05-22 ENCOUNTER — TELEPHONE (OUTPATIENT)
Dept: INTERNAL MEDICINE | Facility: CLINIC | Age: 78
End: 2023-05-22
Payer: MEDICARE

## 2023-06-05 PROBLEM — Z00.00 WELLNESS EXAMINATION: Status: RESOLVED | Noted: 2023-03-01 | Resolved: 2023-06-05

## 2023-07-19 DIAGNOSIS — I10 HYPERTENSION, UNSPECIFIED TYPE: ICD-10-CM

## 2023-07-19 RX ORDER — LOSARTAN POTASSIUM AND HYDROCHLOROTHIAZIDE 12.5; 1 MG/1; MG/1
1 TABLET ORAL DAILY
Qty: 90 TABLET | Refills: 3 | Status: SHIPPED | OUTPATIENT
Start: 2023-07-19 | End: 2024-07-13

## 2023-08-15 ENCOUNTER — TELEPHONE (OUTPATIENT)
Dept: INTERNAL MEDICINE | Facility: CLINIC | Age: 78
End: 2023-08-15

## 2023-08-15 NOTE — TELEPHONE ENCOUNTER
----- Message from Anusha Borrero sent at 8/15/2023  1:19 PM CDT -----  Regarding: advice  Type:  Needs Medical Advice    Who Called: pt  Symptoms (please be specific): sinus pressure, slight congestion  How long has patient had these symptoms:  friday  Pharmacy name and phone #:  [facility] [address] & [facility]  Would the patient rather a call back or a response via [facility]? C/b  Best Call Back Number: [phone]  Additional Information: pt tested positive for covid at home on Saturday. Front facial pressure @ sinus area cheeks and forehead (so pt took b/p 177/93).  Pt stopped taking her amlodipine for at least the past month.  Pt wants to know if it is ok to start taking it again.    Please contact pt    Thank you

## 2023-08-15 NOTE — TELEPHONE ENCOUNTER
VITO   Tested positive for covid, but feels stronger today. Advised to get back on amlodipine as her BP is rising. Recommended otc meds as she is feeling better.

## 2023-09-05 ENCOUNTER — TELEPHONE (OUTPATIENT)
Dept: INTERNAL MEDICINE | Facility: CLINIC | Age: 78
End: 2023-09-05
Payer: MEDICARE

## 2023-09-05 NOTE — TELEPHONE ENCOUNTER
----- Message from Mimi Irizarry sent at 9/5/2023 11:46 AM CDT -----  Regarding: Referral  .Type:  Patient Requesting Referral    Who Called:Ciarra  Does the patient already have the specialty appointment scheduled?:no  If yes, what is the date of that appointment?:no  Referral to What Specialty:Back  Reason for Referral:Back issues  Does the patient want the referral with a specific physician?:No  Is the specialist an [facility]?:Any  Patient Requesting a Response?:Yes  Would the patient rather a call back or a response via [facility]?   Best Call Back Number:[phone]  Additional Information: Need referral sent in for her back. Issues are in the small of her back

## 2023-09-06 ENCOUNTER — OFFICE VISIT (OUTPATIENT)
Dept: INTERNAL MEDICINE | Facility: CLINIC | Age: 78
End: 2023-09-06
Payer: MEDICARE

## 2023-09-06 VITALS
TEMPERATURE: 98 F | HEIGHT: 63 IN | RESPIRATION RATE: 14 BRPM | DIASTOLIC BLOOD PRESSURE: 68 MMHG | HEART RATE: 64 BPM | BODY MASS INDEX: 25.52 KG/M2 | OXYGEN SATURATION: 98 % | SYSTOLIC BLOOD PRESSURE: 132 MMHG | WEIGHT: 144 LBS

## 2023-09-06 DIAGNOSIS — M54.40 BILATERAL LOW BACK PAIN WITH SCIATICA, SCIATICA LATERALITY UNSPECIFIED, UNSPECIFIED CHRONICITY: Primary | ICD-10-CM

## 2023-09-06 PROCEDURE — 3077F SYST BP >= 140 MM HG: CPT | Mod: CPTII,,, | Performed by: INTERNAL MEDICINE

## 2023-09-06 PROCEDURE — 1159F PR MEDICATION LIST DOCUMENTED IN MEDICAL RECORD: ICD-10-PCS | Mod: CPTII,,, | Performed by: INTERNAL MEDICINE

## 2023-09-06 PROCEDURE — 1101F PR PT FALLS ASSESS DOC 0-1 FALLS W/OUT INJ PAST YR: ICD-10-PCS | Mod: CPTII,,, | Performed by: INTERNAL MEDICINE

## 2023-09-06 PROCEDURE — 1101F PT FALLS ASSESS-DOCD LE1/YR: CPT | Mod: CPTII,,, | Performed by: INTERNAL MEDICINE

## 2023-09-06 PROCEDURE — 1160F RVW MEDS BY RX/DR IN RCRD: CPT | Mod: CPTII,,, | Performed by: INTERNAL MEDICINE

## 2023-09-06 PROCEDURE — 3288F FALL RISK ASSESSMENT DOCD: CPT | Mod: CPTII,,, | Performed by: INTERNAL MEDICINE

## 2023-09-06 PROCEDURE — 3078F DIAST BP <80 MM HG: CPT | Mod: CPTII,,, | Performed by: INTERNAL MEDICINE

## 2023-09-06 PROCEDURE — 1160F PR REVIEW ALL MEDS BY PRESCRIBER/CLIN PHARMACIST DOCUMENTED: ICD-10-PCS | Mod: CPTII,,, | Performed by: INTERNAL MEDICINE

## 2023-09-06 PROCEDURE — 1159F MED LIST DOCD IN RCRD: CPT | Mod: CPTII,,, | Performed by: INTERNAL MEDICINE

## 2023-09-06 PROCEDURE — 3077F PR MOST RECENT SYSTOLIC BLOOD PRESSURE >= 140 MM HG: ICD-10-PCS | Mod: CPTII,,, | Performed by: INTERNAL MEDICINE

## 2023-09-06 PROCEDURE — 99213 PR OFFICE/OUTPT VISIT, EST, LEVL III, 20-29 MIN: ICD-10-PCS | Mod: 25,,, | Performed by: INTERNAL MEDICINE

## 2023-09-06 PROCEDURE — 99213 OFFICE O/P EST LOW 20 MIN: CPT | Mod: 25,,, | Performed by: INTERNAL MEDICINE

## 2023-09-06 PROCEDURE — 1125F AMNT PAIN NOTED PAIN PRSNT: CPT | Mod: CPTII,,, | Performed by: INTERNAL MEDICINE

## 2023-09-06 PROCEDURE — 3288F PR FALLS RISK ASSESSMENT DOCUMENTED: ICD-10-PCS | Mod: CPTII,,, | Performed by: INTERNAL MEDICINE

## 2023-09-06 PROCEDURE — 96372 THER/PROPH/DIAG INJ SC/IM: CPT | Mod: ,,, | Performed by: INTERNAL MEDICINE

## 2023-09-06 PROCEDURE — 1125F PR PAIN SEVERITY QUANTIFIED, PAIN PRESENT: ICD-10-PCS | Mod: CPTII,,, | Performed by: INTERNAL MEDICINE

## 2023-09-06 PROCEDURE — 96372 PR INJECTION,THERAP/PROPH/DIAG2ST, IM OR SUBCUT: ICD-10-PCS | Mod: ,,, | Performed by: INTERNAL MEDICINE

## 2023-09-06 PROCEDURE — 3078F PR MOST RECENT DIASTOLIC BLOOD PRESSURE < 80 MM HG: ICD-10-PCS | Mod: CPTII,,, | Performed by: INTERNAL MEDICINE

## 2023-09-06 RX ORDER — MELOXICAM 15 MG/1
15 TABLET ORAL DAILY
Qty: 10 TABLET | Refills: 0 | Status: SHIPPED | OUTPATIENT
Start: 2023-09-06 | End: 2023-09-16

## 2023-09-06 RX ORDER — DEXAMETHASONE SODIUM PHOSPHATE 4 MG/ML
12 INJECTION, SOLUTION INTRA-ARTICULAR; INTRALESIONAL; INTRAMUSCULAR; INTRAVENOUS; SOFT TISSUE ONCE
Status: COMPLETED | OUTPATIENT
Start: 2023-09-06 | End: 2023-09-06

## 2023-09-06 RX ADMIN — DEXAMETHASONE SODIUM PHOSPHATE 12 MG: 4 INJECTION, SOLUTION INTRA-ARTICULAR; INTRALESIONAL; INTRAMUSCULAR; INTRAVENOUS; SOFT TISSUE at 02:09

## 2023-09-06 NOTE — PROGRESS NOTES
"Subjective:       Patient ID: Ciarra Daniel is a 78 y.o. female.    Chief Complaint: Back Pain    78-year-old female reports ongoing lower back pain, nonradiating, over the past 4 months.  There was no inciting event.  She has not been taking anything over-the-counter      Review of Systems   Constitutional:  Negative for fever.   HENT:  Negative for nosebleeds.    Eyes:  Negative for visual disturbance.   Respiratory:  Negative for shortness of breath.    Cardiovascular:  Negative for chest pain.   Gastrointestinal:  Negative for abdominal pain.   Genitourinary:  Negative for dysuria.   Musculoskeletal:  Positive for back pain and gait problem.   Neurological:  Negative for headaches.         Objective:      Physical Exam  HENT:      Head: Normocephalic.      Mouth/Throat:      Pharynx: Oropharynx is clear.   Eyes:      Extraocular Movements: Extraocular movements intact.   Pulmonary:      Effort: No respiratory distress.   Musculoskeletal:         General: No swelling.      Comments: Straight leg raise test negative bilaterally   Skin:     General: Skin is warm.   Neurological:      General: No focal deficit present.      Mental Status: She is alert and oriented to person, place, and time.      Motor: No weakness.   Psychiatric:         Mood and Affect: Mood normal.         Vitals:    09/06/23 1339   BP: (!) 146/60   Pulse: 64   Resp: 14   Temp: 98.2 °F (36.8 °C)   SpO2: 98%   Weight: 65.3 kg (144 lb)   Height: 5' 3" (1.6 m)      Assessment:       Problem List Items Addressed This Visit    None      Medication List with Changes/Refills   Current Medications    AMLODIPINE (NORVASC) 10 MG TABLET    Take 1 tablet (10 mg total) by mouth Daily.    ATENOLOL (TENORMIN) 50 MG TABLET    Take 1 tablet (50 mg total) by mouth once daily.    LATANOPROST 0.005 % OPHTHALMIC SOLUTION    Place 1 drop into both eyes every evening.    LOSARTAN-HYDROCHLOROTHIAZIDE 100-12.5 MG (HYZAAR) 100-12.5 MG TAB    Take 1 tablet by mouth " once daily.    TRAVOPROST (TRAVATAN Z) 0.004 % OPHTHALMIC SOLUTION        VITAMIN D (VITAMIN D3) 1000 UNITS TAB    Take 3,000 Units by mouth Daily.   Discontinued Medications    AZELASTINE (ASTELIN) 137 MCG (0.1 %) NASAL SPRAY    1 spray (137 mcg total) by Nasal route 2 (two) times daily.        Plan:       Low back pain   Sacroiliitis     Dexamethasone injection today, meloxicam for 10 days, and refer to physical therapy

## 2023-10-03 ENCOUNTER — TELEPHONE (OUTPATIENT)
Dept: INTERNAL MEDICINE | Facility: CLINIC | Age: 78
End: 2023-10-03
Payer: MEDICARE

## 2023-10-03 DIAGNOSIS — M54.40 BILATERAL LOW BACK PAIN WITH SCIATICA, SCIATICA LATERALITY UNSPECIFIED, UNSPECIFIED CHRONICITY: Primary | ICD-10-CM

## 2023-12-04 DIAGNOSIS — I10 HYPERTENSION, UNSPECIFIED TYPE: ICD-10-CM

## 2023-12-04 RX ORDER — ATENOLOL 50 MG/1
50 TABLET ORAL DAILY
Qty: 90 TABLET | Refills: 3 | Status: SHIPPED | OUTPATIENT
Start: 2023-12-04

## 2024-01-18 ENCOUNTER — TELEPHONE (OUTPATIENT)
Dept: INTERNAL MEDICINE | Facility: CLINIC | Age: 79
End: 2024-01-18
Payer: MEDICARE

## 2024-01-18 NOTE — TELEPHONE ENCOUNTER
----- Message from Mimi Irizarry sent at 1/18/2024  8:35 AM CST -----  .Type:  Patient Returning Call    Who Called:Ciarra  Who Left Message for Patient:pt  Does the patient know what this is regarding?:Call Back  Would the patient rather a call back or a response via [facility]? EDWARDO  Best Call Back Number:[phone]  Additional Information: Please call back

## 2024-01-19 ENCOUNTER — OFFICE VISIT (OUTPATIENT)
Dept: INTERNAL MEDICINE | Facility: CLINIC | Age: 79
End: 2024-01-19
Payer: MEDICARE

## 2024-01-19 VITALS
RESPIRATION RATE: 16 BRPM | SYSTOLIC BLOOD PRESSURE: 130 MMHG | BODY MASS INDEX: 25.52 KG/M2 | OXYGEN SATURATION: 98 % | HEIGHT: 63 IN | DIASTOLIC BLOOD PRESSURE: 70 MMHG | WEIGHT: 144 LBS | TEMPERATURE: 98 F | HEART RATE: 72 BPM

## 2024-01-19 DIAGNOSIS — F41.9 MILD ANXIETY: Primary | ICD-10-CM

## 2024-01-19 PROCEDURE — 3078F DIAST BP <80 MM HG: CPT | Mod: CPTII,,, | Performed by: INTERNAL MEDICINE

## 2024-01-19 PROCEDURE — 1160F RVW MEDS BY RX/DR IN RCRD: CPT | Mod: CPTII,,, | Performed by: INTERNAL MEDICINE

## 2024-01-19 PROCEDURE — 99213 OFFICE O/P EST LOW 20 MIN: CPT | Mod: ,,, | Performed by: INTERNAL MEDICINE

## 2024-01-19 PROCEDURE — 1159F MED LIST DOCD IN RCRD: CPT | Mod: CPTII,,, | Performed by: INTERNAL MEDICINE

## 2024-01-19 PROCEDURE — 1101F PT FALLS ASSESS-DOCD LE1/YR: CPT | Mod: CPTII,,, | Performed by: INTERNAL MEDICINE

## 2024-01-19 PROCEDURE — 3288F FALL RISK ASSESSMENT DOCD: CPT | Mod: CPTII,,, | Performed by: INTERNAL MEDICINE

## 2024-01-19 PROCEDURE — 3075F SYST BP GE 130 - 139MM HG: CPT | Mod: CPTII,,, | Performed by: INTERNAL MEDICINE

## 2024-01-19 RX ORDER — ESCITALOPRAM OXALATE 10 MG/1
10 TABLET ORAL DAILY
Qty: 30 TABLET | Refills: 11 | Status: SHIPPED | OUTPATIENT
Start: 2024-01-19 | End: 2025-01-18
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
## 2024-02-24 ENCOUNTER — OFFICE VISIT (OUTPATIENT)
Dept: URGENT CARE | Facility: CLINIC | Age: 79
End: 2024-02-24
Payer: MEDICARE

## 2024-02-24 VITALS
HEIGHT: 63 IN | RESPIRATION RATE: 20 BRPM | WEIGHT: 145 LBS | BODY MASS INDEX: 25.69 KG/M2 | OXYGEN SATURATION: 98 % | TEMPERATURE: 99 F | SYSTOLIC BLOOD PRESSURE: 149 MMHG | DIASTOLIC BLOOD PRESSURE: 81 MMHG | HEART RATE: 76 BPM

## 2024-02-24 DIAGNOSIS — J32.9 SINUSITIS, UNSPECIFIED CHRONICITY, UNSPECIFIED LOCATION: ICD-10-CM

## 2024-02-24 DIAGNOSIS — R05.9 COUGH, UNSPECIFIED TYPE: Primary | ICD-10-CM

## 2024-02-24 DIAGNOSIS — R42 DIZZY SPELLS: ICD-10-CM

## 2024-02-24 LAB
CTP QC/QA: YES
CTP QC/QA: YES
EKG 12-LEAD: NORMAL
POC MOLECULAR INFLUENZA A AGN: NEGATIVE
POC MOLECULAR INFLUENZA B AGN: NEGATIVE
PR INTERVAL: NORMAL
PRT AXES: NORMAL
QRS DURATION: NORMAL
QT/QTC: NORMAL
SARS-COV-2 RDRP RESP QL NAA+PROBE: NEGATIVE
VENTRICULAR RATE: NORMAL

## 2024-02-24 PROCEDURE — 87502 INFLUENZA DNA AMP PROBE: CPT | Mod: QW,,,

## 2024-02-24 PROCEDURE — 99213 OFFICE O/P EST LOW 20 MIN: CPT | Mod: ,,,

## 2024-02-24 PROCEDURE — 93000 ELECTROCARDIOGRAM COMPLETE: CPT | Mod: ,,,

## 2024-02-24 PROCEDURE — 87635 SARS-COV-2 COVID-19 AMP PRB: CPT | Mod: QW,,,

## 2024-02-24 RX ORDER — PREDNISONE 20 MG/1
20 TABLET ORAL DAILY
Qty: 5 TABLET | Refills: 0 | Status: SHIPPED | OUTPATIENT
Start: 2024-02-24 | End: 2024-02-29

## 2024-02-24 RX ORDER — AMOXICILLIN 500 MG/1
500 CAPSULE ORAL EVERY 12 HOURS
Qty: 14 CAPSULE | Refills: 0 | Status: SHIPPED | OUTPATIENT
Start: 2024-02-24 | End: 2024-03-02

## 2024-02-24 NOTE — PROGRESS NOTES
"Subjective:      Patient ID: Ciarra Daniel is a 78 y.o. female.    Vitals:  height is 5' 3" (1.6 m) and weight is 65.8 kg (145 lb). Her oral temperature is 98.6 °F (37 °C). Her blood pressure is 156/84 (abnormal) and her pulse is 76. Her respiration is 20 and oxygen saturation is 98%.     Chief Complaint: Sinus Problem (Sinus pressure, cough x 2 weeks. Dizzy spell yesterday. )    A 79 y/o female presents to the clinic with c/o sinus pressure, headache, nasal congestion, and post nasal drip over the last week. She reports having a cough and some rhinorrhea over the last two weeks that changed to thick congestion and sinus pressure/pain recently. She denies any fever, body aches, chills, sob, cp, n/v/d, abdominal complaints, rash, difficulty swallowing, neck stiffness, or changes in intake or output.     She also reports an episode of dizziness that last a few seconds to a minute yesterday when getting up quickly from the floor, she denies any associated chest pain, weakness, shortness of breath, speech changes or facial drooping and has had similar episodes when standing before. She denies any current dizziness.     Sinus Problem  Associated symptoms include congestion, sinus pressure and a sore throat. Pertinent negatives include no chills, coughing or shortness of breath.       Constitution: Negative for chills, fatigue and fever.   HENT:  Positive for congestion, postnasal drip, sinus pain, sinus pressure and sore throat. Negative for trouble swallowing and voice change.    Eyes: Negative.    Respiratory:  Negative for cough, shortness of breath, wheezing and asthma.    Allergic/Immunologic: Negative for asthma.      Objective:     Physical Exam   Constitutional: She is oriented to person, place, and time. She appears well-developed. She is cooperative.  Non-toxic appearance. She does not appear ill. No distress.   HENT:   Head: Normocephalic and atraumatic.   Ears:   Right Ear: Hearing, tympanic membrane and " external ear normal.   Left Ear: Hearing, tympanic membrane and external ear normal.   Nose: Congestion present.   Mouth/Throat: Mucous membranes are normal. Mucous membranes are moist. Posterior oropharyngeal erythema present. Oropharynx is clear.   Eyes: Conjunctivae and lids are normal.   Neck: Trachea normal and phonation normal. Neck supple. No edema present. No erythema present. No neck rigidity present.   Cardiovascular: Normal rate.   Pulmonary/Chest: Effort normal and breath sounds normal. No stridor. No respiratory distress. She has no decreased breath sounds. She has no wheezes. She has no rhonchi. She has no rales.   Abdominal: Normal appearance.   Neurological: She is alert and oriented to person, place, and time. She exhibits normal muscle tone. Coordination normal.   Skin: Skin is warm, dry, intact, not diaphoretic and no rash. Capillary refill takes less than 2 seconds.   Psychiatric: Her speech is normal and behavior is normal. Mood normal.   Nursing note and vitals reviewed.       Previous History      Review of patient's allergies indicates:   Allergen Reactions    Ace inhibitors      Other reaction(s): tongue swelling       Past Medical History:   Diagnosis Date    Essential (primary) hypertension     Unspecified glaucoma      Current Outpatient Medications   Medication Instructions    amLODIPine (NORVASC) 10 mg, Oral, Daily    amoxicillin (AMOXIL) 500 mg, Oral, Every 12 hours    atenoloL (TENORMIN) 50 mg, Oral, Daily    EScitalopram oxalate (LEXAPRO) 10 mg, Oral, Daily    latanoprost 0.005 % ophthalmic solution 1 drop, Both Eyes, Nightly    losartan-hydrochlorothiazide 100-12.5 mg (HYZAAR) 100-12.5 mg Tab 1 tablet, Oral, Daily    predniSONE (DELTASONE) 20 mg, Oral, Daily    travoprost (TRAVATAN Z) 0.004 % ophthalmic solution No dose, route, or frequency recorded.    vitamin D (VITAMIN D3) 3,000 Units, Oral, Daily     Past Surgical History:   Procedure Laterality Date    COLONOSCOPY  12/18/2017  "   KNEE ARTHROSCOPY Left     KNEE SURGERY      RIGHT HEMICOLECTOMY      SMALL INTESTINE SURGERY  2013    THYROIDECTOMY, PARTIAL      TUBAL LIGATION       Family History   Problem Relation Age of Onset    Colon cancer Mother     Hypertension Mother     Asthma Mother        Social History     Tobacco Use    Smoking status: Never    Smokeless tobacco: Never   Substance Use Topics    Alcohol use: Not Currently    Drug use: Never        Physical Exam      Vital Signs Reviewed   BP (!) 156/84 (Patient Position: Standing)   Pulse 76   Temp 98.6 °F (37 °C) (Oral)   Resp 20   Ht 5' 3" (1.6 m)   Wt 65.8 kg (145 lb)   SpO2 98%   BMI 25.69 kg/m²        Procedures    Procedures     Labs     Results for orders placed or performed in visit on 02/24/24   POCT COVID-19 Rapid Screening   Result Value Ref Range    POC Rapid COVID Negative Negative     Acceptable Yes    POCT Influenza A/B Molecular   Result Value Ref Range    POC Molecular Influenza A Ag Negative Negative, Not Reported    POC Molecular Influenza B Ag Negative Negative, Not Reported     Acceptable Yes          Assessment:     1. Cough, unspecified type    2. Sinusitis, unspecified chronicity, unspecified location        Plan:       Cough, unspecified type  -     POCT COVID-19 Rapid Screening  -     POCT Influenza A/B Molecular    Sinusitis, unspecified chronicity, unspecified location    Other orders  -     amoxicillin (AMOXIL) 500 MG capsule; Take 1 capsule (500 mg total) by mouth every 12 (twelve) hours. for 7 days  Dispense: 14 capsule; Refill: 0  -     predniSONE (DELTASONE) 20 MG tablet; Take 1 tablet (20 mg total) by mouth once daily. for 5 days  Dispense: 5 tablet; Refill: 0    Covid and flu negative   EKG NSR rate of 71; no ST changes     Start the oral steroid today  Start the antibiotic today, take to completion, take with food  Start taking an allergy pill daily such as claritin, zyrtec, allegrea or xyzal. Coricidin HBP as " needed for severe nasal congestion. They can be purchased over the counter. Monitor for fever. Take tylenol/acetaminophen or ibuprofen as needed. Rest and hydrate. If symptoms persist or worsen, return to clinic or seek medical attention immediately.

## 2024-02-24 NOTE — PATIENT INSTRUCTIONS
Start the oral steroid today  Start the antibiotic today, take to completion, take with food  Start taking an allergy pill daily such as claritin, zyrtec, allegrea or xyzal.  Coricidin HBP as needed for severe nasal congestion. They can be purchased over the counter.Monitor for fever. Take tylenol/acetaminophen or ibuprofen as needed. Rest and hydrate. If symptoms persist or worsen, return to clinic or seek medical attention immediately.

## 2024-02-29 ENCOUNTER — TELEPHONE (OUTPATIENT)
Dept: INTERNAL MEDICINE | Facility: CLINIC | Age: 79
End: 2024-02-29
Payer: MEDICARE

## 2024-02-29 NOTE — TELEPHONE ENCOUNTER
VITO  Spoke with patient at this time. Still having pressure so I advised to try tylenol otc as well as zyrtec. Concerned about glaucoma and advised to avoid decongestants.

## 2024-04-09 DIAGNOSIS — I10 HYPERTENSION, UNSPECIFIED TYPE: ICD-10-CM

## 2024-04-09 RX ORDER — AMLODIPINE BESYLATE 10 MG/1
10 TABLET ORAL DAILY
Qty: 90 TABLET | Refills: 3 | Status: SHIPPED | OUTPATIENT
Start: 2024-04-09

## 2024-05-10 ENCOUNTER — LAB VISIT (OUTPATIENT)
Dept: LAB | Facility: HOSPITAL | Age: 79
End: 2024-05-10
Attending: INTERNAL MEDICINE
Payer: MEDICARE

## 2024-05-10 DIAGNOSIS — E78.5 DYSLIPIDEMIA: ICD-10-CM

## 2024-05-10 DIAGNOSIS — T46.6X5A STATIN-INDUCED MYOSITIS: ICD-10-CM

## 2024-05-10 DIAGNOSIS — Z00.00 WELLNESS EXAMINATION: ICD-10-CM

## 2024-05-10 DIAGNOSIS — M60.9 STATIN-INDUCED MYOSITIS: ICD-10-CM

## 2024-05-10 DIAGNOSIS — I10 PRIMARY HYPERTENSION: ICD-10-CM

## 2024-05-10 DIAGNOSIS — R79.89 LOW TSH LEVEL: ICD-10-CM

## 2024-05-10 LAB
ALBUMIN SERPL-MCNC: 3.7 G/DL (ref 3.4–4.8)
ALBUMIN/GLOB SERPL: 1.3 RATIO (ref 1.1–2)
ALP SERPL-CCNC: 99 UNIT/L (ref 40–150)
ALT SERPL-CCNC: 9 UNIT/L (ref 0–55)
AST SERPL-CCNC: 12 UNIT/L (ref 5–34)
BACTERIA #/AREA URNS AUTO: ABNORMAL /HPF
BASOPHILS # BLD AUTO: 0.02 X10(3)/MCL
BASOPHILS NFR BLD AUTO: 0.3 %
BILIRUB SERPL-MCNC: 0.5 MG/DL
BILIRUB UR QL STRIP.AUTO: NEGATIVE
BUN SERPL-MCNC: 14.2 MG/DL (ref 9.8–20.1)
CALCIUM SERPL-MCNC: 9 MG/DL (ref 8.4–10.2)
CHLORIDE SERPL-SCNC: 105 MMOL/L (ref 98–107)
CHOLEST SERPL-MCNC: 214 MG/DL
CHOLEST/HDLC SERPL: 4 {RATIO} (ref 0–5)
CLARITY UR: CLEAR
CO2 SERPL-SCNC: 26 MMOL/L (ref 23–31)
COLOR UR AUTO: ABNORMAL
CREAT SERPL-MCNC: 0.78 MG/DL (ref 0.55–1.02)
EOSINOPHIL # BLD AUTO: 0.28 X10(3)/MCL (ref 0–0.9)
EOSINOPHIL NFR BLD AUTO: 4.4 %
ERYTHROCYTE [DISTWIDTH] IN BLOOD BY AUTOMATED COUNT: 12.3 % (ref 11.5–17)
GFR SERPLBLD CREATININE-BSD FMLA CKD-EPI: >60 ML/MIN/1.73/M2
GLOBULIN SER-MCNC: 2.9 GM/DL (ref 2.4–3.5)
GLUCOSE SERPL-MCNC: 88 MG/DL (ref 82–115)
GLUCOSE UR QL STRIP: NORMAL
HCT VFR BLD AUTO: 39.8 % (ref 37–47)
HDLC SERPL-MCNC: 55 MG/DL (ref 35–60)
HGB BLD-MCNC: 12.5 G/DL (ref 12–16)
HGB UR QL STRIP: NEGATIVE
IMM GRANULOCYTES # BLD AUTO: 0.02 X10(3)/MCL (ref 0–0.04)
IMM GRANULOCYTES NFR BLD AUTO: 0.3 %
KETONES UR QL STRIP: NEGATIVE
LDLC SERPL CALC-MCNC: 143 MG/DL (ref 50–140)
LEUKOCYTE ESTERASE UR QL STRIP: 25
LYMPHOCYTES # BLD AUTO: 1.33 X10(3)/MCL (ref 0.6–4.6)
LYMPHOCYTES NFR BLD AUTO: 21.1 %
MCH RBC QN AUTO: 26.6 PG (ref 27–31)
MCHC RBC AUTO-ENTMCNC: 31.4 G/DL (ref 33–36)
MCV RBC AUTO: 84.7 FL (ref 80–94)
MONOCYTES # BLD AUTO: 0.49 X10(3)/MCL (ref 0.1–1.3)
MONOCYTES NFR BLD AUTO: 7.8 %
MUCOUS THREADS URNS QL MICRO: ABNORMAL /LPF
NEUTROPHILS # BLD AUTO: 4.16 X10(3)/MCL (ref 2.1–9.2)
NEUTROPHILS NFR BLD AUTO: 66.1 %
NITRITE UR QL STRIP: NEGATIVE
NRBC BLD AUTO-RTO: 0 %
PH UR STRIP: 5.5 [PH]
PLATELET # BLD AUTO: 297 X10(3)/MCL (ref 130–400)
PMV BLD AUTO: 10.4 FL (ref 7.4–10.4)
POTASSIUM SERPL-SCNC: 4.2 MMOL/L (ref 3.5–5.1)
PROLACTIN LEVEL (OLG): 5.38 NG/ML (ref 5.18–26.53)
PROT SERPL-MCNC: 6.6 GM/DL (ref 5.8–7.6)
PROT UR QL STRIP: NEGATIVE
RBC # BLD AUTO: 4.7 X10(6)/MCL (ref 4.2–5.4)
RBC #/AREA URNS AUTO: ABNORMAL /HPF
SODIUM SERPL-SCNC: 139 MMOL/L (ref 136–145)
SP GR UR STRIP.AUTO: 1.01 (ref 1–1.03)
SQUAMOUS #/AREA URNS LPF: ABNORMAL /HPF
T4 FREE SERPL-MCNC: 1.22 NG/DL (ref 0.7–1.48)
TRIGL SERPL-MCNC: 78 MG/DL (ref 37–140)
TSH SERPL-ACNC: <0.008 UIU/ML (ref 0.35–4.94)
UROBILINOGEN UR STRIP-ACNC: NORMAL
VLDLC SERPL CALC-MCNC: 16 MG/DL
WBC # SPEC AUTO: 6.3 X10(3)/MCL (ref 4.5–11.5)
WBC #/AREA URNS AUTO: ABNORMAL /HPF

## 2024-05-10 PROCEDURE — 84146 ASSAY OF PROLACTIN: CPT

## 2024-05-10 PROCEDURE — 84443 ASSAY THYROID STIM HORMONE: CPT

## 2024-05-10 PROCEDURE — 85025 COMPLETE CBC W/AUTO DIFF WBC: CPT

## 2024-05-10 PROCEDURE — 81001 URINALYSIS AUTO W/SCOPE: CPT

## 2024-05-10 PROCEDURE — 84439 ASSAY OF FREE THYROXINE: CPT

## 2024-05-10 PROCEDURE — 36415 COLL VENOUS BLD VENIPUNCTURE: CPT

## 2024-05-10 PROCEDURE — 80053 COMPREHEN METABOLIC PANEL: CPT

## 2024-05-10 PROCEDURE — 80061 LIPID PANEL: CPT

## 2024-05-23 NOTE — PROGRESS NOTES
"Subjective:       Patient ID: Ciarra Daniel is a 79 y.o. female.      Patient Care Team:  Anand Gordon II, MD as PCP - General (Internal Medicine)    Chief Complaint: Medicare AWV Follow Up, Hypertension, and Dyslipidemia    79-year-old female seen today for followup of hypertension, hyperlipidemia, obesity, and colonic lesion.      Review of Systems   Constitutional:  Negative for fever.   HENT:  Negative for nosebleeds.    Eyes:  Negative for visual disturbance.   Respiratory:  Negative for shortness of breath.    Cardiovascular:  Negative for chest pain.   Gastrointestinal:  Negative for abdominal pain.   Genitourinary:  Negative for dysuria.   Musculoskeletal:  Negative for gait problem.   Neurological:  Negative for headaches.           Patient Reported Health Risk Assessment         Objective:      Physical Exam  HENT:      Head: Normocephalic.      Mouth/Throat:      Pharynx: Oropharynx is clear.   Eyes:      Extraocular Movements: Extraocular movements intact.   Cardiovascular:      Rate and Rhythm: Normal rate.   Pulmonary:      Breath sounds: Normal breath sounds.   Abdominal:      Palpations: Abdomen is soft.   Musculoskeletal:         General: No swelling.   Skin:     General: Skin is warm.   Neurological:      General: No focal deficit present.      Mental Status: She is alert and oriented to person, place, and time.   Psychiatric:         Mood and Affect: Mood normal.         Vitals:    05/24/24 0941   BP: 132/74   Pulse: 68   Resp: 16   Temp: 98 °F (36.7 °C)   SpO2: 97%   Weight: 65.8 kg (145 lb)   Height: 5' 3" (1.6 m)                   No data to display                  5/24/2024    10:00 AM 1/19/2024    10:00 AM 9/6/2023     1:45 PM 4/6/2023     2:45 PM 3/28/2023     3:00 PM 9/1/2022    10:15 AM   Fall Risk Assessment - Outpatient   Mobility Status Ambulatory Ambulatory Ambulatory Ambulatory Ambulatory Ambulatory   Number of falls 0 0 0 0  0   Identified as fall risk False False False " False  False                  Assessment:       Problem List Items Addressed This Visit          Cardiac/Vascular    Primary hypertension - Primary    Relevant Orders    CBC Auto Differential    Comprehensive Metabolic Panel    Lipid Panel    Urinalysis, Reflex to Urine Culture    T4, Free    TSH    Dyslipidemia    Relevant Orders    CBC Auto Differential    Comprehensive Metabolic Panel    Lipid Panel    Urinalysis, Reflex to Urine Culture    T4, Free    TSH       Endocrine    Low TSH level    Relevant Orders    CBC Auto Differential    Comprehensive Metabolic Panel    Lipid Panel    Urinalysis, Reflex to Urine Culture    T4, Free    TSH       Other    Statin-induced myositis    Relevant Orders    CBC Auto Differential    Comprehensive Metabolic Panel    Lipid Panel    Urinalysis, Reflex to Urine Culture    T4, Free    TSH    RESOLVED: Wellness examination    Relevant Orders    CBC Auto Differential    Comprehensive Metabolic Panel    Lipid Panel    Urinalysis, Reflex to Urine Culture    T4, Free    TSH       Medication List with Changes/Refills   Current Medications    AMLODIPINE (NORVASC) 10 MG TABLET    Take 1 tablet (10 mg total) by mouth Daily.    ATENOLOL (TENORMIN) 50 MG TABLET    Take 1 tablet (50 mg total) by mouth once daily.    ESCITALOPRAM OXALATE (LEXAPRO) 10 MG TABLET    Take 1 tablet (10 mg total) by mouth once daily.    LATANOPROST 0.005 % OPHTHALMIC SOLUTION    Place 1 drop into both eyes every evening.    LOSARTAN-HYDROCHLOROTHIAZIDE 100-12.5 MG (HYZAAR) 100-12.5 MG TAB    Take 1 tablet by mouth once daily.    VITAMIN D (VITAMIN D3) 1000 UNITS TAB    Take 3,000 Units by mouth Daily.   Discontinued Medications    TRAVOPROST (TRAVATAN Z) 0.004 % OPHTHALMIC SOLUTION            Plan:       1. Hypertension: Stable    2. Hyperlipidemia: We ordered Ca score in the past but was unable to have it done. Myalgia with statins.     3. Colon polyp: She had a right hemicolectomy in 2013. She denies abdominal  pain or other issues. She was scheduled for C-scope last year    4. Skin lesions: She has numerous skin tags on the [place] of her neck removed in 2020    5. Osteoarthritis: Followed by orthopedics. Avoid meloxicam because of hypertension. We discussed Tylenol    6. Low TSH level: She has had this for years. She is asymptomatic. Prolactin in 2024. We will simply monitor    7.  Paresthesia: Left hand numbness and tingling, saw orthopedics, likely carpal tunnel    8. Wellness: [facility] 10/2021, refer again. 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 She has 5 children            Medicare Annual Wellness and Personalized Prevention Plan:   Fall Risk + Home Safety + Hearing Impairment + Depression Screen + Cognitive Impairment Screen + Health Risk Assessment all reviewed    Health Maintenance Topics with due status: Not Due       Topic Last Completion Date    Lipid Panel 05/10/2024    Influenza Vaccine Not Due      The patient's Health Maintenance was reviewed and the following appears to be due at this time:   Health Maintenance Due   Topic Date Due    Hepatitis C Screening  Never done    TETANUS VACCINE  Never done    DEXA Scan  Never done    Shingles Vaccine (1 of 2) Never done    RSV Vaccine (Age 60+ and Pregnant patients) (1 - 1-dose 60+ series) Never done    Pneumococcal Vaccines (Age 65+) (1 of 1 - PCV) Never done    COVID-19 Vaccine (4 - 2023-24 season) 09/01/2023       Advance Care Planning   I attest to discussing Advance Care Planning with patient and/or family member.  Education was provided including the importance of the Health Care Power of , Advance Directives, and/or [place] documentation.  The patient expressed understanding to the importance of this information and discussion.  Length of ACP conversation in minutes: 1    Advance Care Planning     Date:  05/23/2024  Patient did not wish or was not able to name a surrogate decision maker or provide an Advance Care Plan.             Opioid Screening: Patient medication list reviewed, patient is not taking prescription opioids. Patient is not using additional opioids than prescribed. Patient is at low risk of substance abuse based on this opioid use history.     No follow-ups on file. In addition to their scheduled follow up, the patient has also been instructed to follow up on as needed basis.

## 2024-05-24 ENCOUNTER — OFFICE VISIT (OUTPATIENT)
Dept: INTERNAL MEDICINE | Facility: CLINIC | Age: 79
End: 2024-05-24
Payer: MEDICARE

## 2024-05-24 VITALS
DIASTOLIC BLOOD PRESSURE: 74 MMHG | HEIGHT: 63 IN | TEMPERATURE: 98 F | OXYGEN SATURATION: 97 % | SYSTOLIC BLOOD PRESSURE: 132 MMHG | HEART RATE: 68 BPM | RESPIRATION RATE: 16 BRPM | BODY MASS INDEX: 25.69 KG/M2 | WEIGHT: 145 LBS

## 2024-05-24 DIAGNOSIS — R79.89 LOW TSH LEVEL: ICD-10-CM

## 2024-05-24 DIAGNOSIS — M60.9 STATIN-INDUCED MYOSITIS: ICD-10-CM

## 2024-05-24 DIAGNOSIS — Z00.00 WELLNESS EXAMINATION: ICD-10-CM

## 2024-05-24 DIAGNOSIS — T46.6X5A STATIN-INDUCED MYOSITIS: ICD-10-CM

## 2024-05-24 DIAGNOSIS — E78.5 DYSLIPIDEMIA: ICD-10-CM

## 2024-05-24 DIAGNOSIS — Z12.31 VISIT FOR SCREENING MAMMOGRAM: ICD-10-CM

## 2024-05-24 DIAGNOSIS — I10 PRIMARY HYPERTENSION: Primary | ICD-10-CM

## 2024-05-24 PROCEDURE — 1159F MED LIST DOCD IN RCRD: CPT | Mod: CPTII,,, | Performed by: INTERNAL MEDICINE

## 2024-05-24 PROCEDURE — 1101F PT FALLS ASSESS-DOCD LE1/YR: CPT | Mod: CPTII,,, | Performed by: INTERNAL MEDICINE

## 2024-05-24 PROCEDURE — 3075F SYST BP GE 130 - 139MM HG: CPT | Mod: CPTII,,, | Performed by: INTERNAL MEDICINE

## 2024-05-24 PROCEDURE — 1124F ACP DISCUSS-NO DSCNMKR DOCD: CPT | Mod: CPTII,,, | Performed by: INTERNAL MEDICINE

## 2024-05-24 PROCEDURE — 1160F RVW MEDS BY RX/DR IN RCRD: CPT | Mod: CPTII,,, | Performed by: INTERNAL MEDICINE

## 2024-05-24 PROCEDURE — G0439 PPPS, SUBSEQ VISIT: HCPCS | Mod: ,,, | Performed by: INTERNAL MEDICINE

## 2024-05-24 PROCEDURE — 3078F DIAST BP <80 MM HG: CPT | Mod: CPTII,,, | Performed by: INTERNAL MEDICINE

## 2024-05-24 PROCEDURE — 3288F FALL RISK ASSESSMENT DOCD: CPT | Mod: CPTII,,, | Performed by: INTERNAL MEDICINE

## 2024-06-12 ENCOUNTER — HOSPITAL ENCOUNTER (OUTPATIENT)
Dept: RADIOLOGY | Facility: HOSPITAL | Age: 79
Discharge: HOME | End: 2024-06-12
Attending: INTERNAL MEDICINE
Payer: MEDICARE

## 2024-06-12 DIAGNOSIS — Z12.31 VISIT FOR SCREENING MAMMOGRAM: ICD-10-CM

## 2024-06-12 PROCEDURE — 77067 SCR MAMMO BI INCL CAD: CPT | Mod: TC

## 2024-06-12 PROCEDURE — 77067 SCR MAMMO BI INCL CAD: CPT | Mod: 26,,, | Performed by: STUDENT IN AN ORGANIZED HEALTH CARE EDUCATION/TRAINING PROGRAM

## 2024-06-12 PROCEDURE — 77063 BREAST TOMOSYNTHESIS BI: CPT | Mod: 26,,, | Performed by: STUDENT IN AN ORGANIZED HEALTH CARE EDUCATION/TRAINING PROGRAM

## 2024-10-21 DIAGNOSIS — I10 HYPERTENSION, UNSPECIFIED TYPE: ICD-10-CM

## 2024-10-21 RX ORDER — LOSARTAN POTASSIUM AND HYDROCHLOROTHIAZIDE 12.5; 1 MG/1; MG/1
1 TABLET ORAL DAILY
Qty: 90 TABLET | Refills: 3 | Status: SHIPPED | OUTPATIENT
Start: 2024-10-21 | End: 2025-10-16

## 2025-05-19 ENCOUNTER — TELEPHONE (OUTPATIENT)
Dept: INTERNAL MEDICINE | Facility: CLINIC | Age: 80
End: 2025-05-19
Payer: MEDICARE